# Patient Record
Sex: FEMALE | Race: BLACK OR AFRICAN AMERICAN | Employment: PART TIME | URBAN - METROPOLITAN AREA
[De-identification: names, ages, dates, MRNs, and addresses within clinical notes are randomized per-mention and may not be internally consistent; named-entity substitution may affect disease eponyms.]

---
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
## 2020-04-20 ENCOUNTER — HOSPITAL ENCOUNTER (EMERGENCY)
Age: 20
Discharge: HOME | End: 2020-04-20
Attending: EMERGENCY MEDICINE
Payer: COMMERCIAL

## 2020-04-20 VITALS
HEART RATE: 75 BPM | DIASTOLIC BLOOD PRESSURE: 59 MMHG | SYSTOLIC BLOOD PRESSURE: 103 MMHG | BODY MASS INDEX: 17.56 KG/M2 | OXYGEN SATURATION: 100 % | TEMPERATURE: 98.5 F | HEIGHT: 61 IN | RESPIRATION RATE: 16 BRPM | WEIGHT: 93 LBS

## 2020-04-20 LAB
CHP ED QC CHECK: YES
PREGNANCY TEST URINE, POC: NEGATIVE

## 2020-04-20 PROCEDURE — 81025 URINE PREGNANCY TEST: CPT

## 2020-04-20 PROCEDURE — 99283 EMERGENCY DEPT VISIT LOW MDM: CPT

## 2020-04-20 RX ORDER — PENICILLIN V POTASSIUM 500 MG/1
500 TABLET ORAL 4 TIMES DAILY
Qty: 40 TABLET | Refills: 0 | Status: SHIPPED | OUTPATIENT
Start: 2020-04-20

## 2020-04-20 RX ORDER — NAPROXEN 500 MG/1
500 TABLET ORAL 2 TIMES DAILY WITH MEALS
Qty: 20 TABLET | Refills: 0 | Status: SHIPPED | OUTPATIENT
Start: 2020-04-20

## 2020-04-20 ASSESSMENT — PAIN SCALES - GENERAL: PAINLEVEL_OUTOF10: 8

## 2020-04-20 NOTE — ED PROVIDER NOTES
PM      PATIENTREFERRED TO:   No follow-up provider specified.     DISCHARGE MEDICATIONS:     Discharge Medication List as of 4/20/2020  4:32 PM      START taking these medications    Details   naproxen (NAPROSYN) 500 MG tablet Take 1 tablet by mouth 2 times daily (with meals), Disp-20 tablet, R-0Print      penicillin v potassium (VEETID) 500 MG tablet Take 1 tablet by mouth 4 times daily, Disp-40 tablet, R-0Print                 (Please note that portions of this note were completed with a voice recognition program.  Efforts were made to edit thedictations but occasionally words are mis-transcribed.)    TUCKER Ernandez PA-C  04/20/20 1784

## 2020-04-20 NOTE — ED NOTES
Called Dr. Carmen Hassan office to verify where office note/pre-op clearance to be faxed to. Spoke with [employer]. Message left for Dr. Carmen Hassan nurse to call the office back. Dental pain for a few days. It is getting worse and she is having swelling on the right side. Her usual dentist is closed with covid and she called another that was open but they don't take her insurance. A+Ox4.  r     Keyanna Wolfe RN  04/20/20 9937

## 2020-04-22 LAB — HCG, PREGNANCY URINE (POC): NEGATIVE

## 2023-05-06 ENCOUNTER — HOSPITAL ENCOUNTER (INPATIENT)
Age: 23
LOS: 3 days | Discharge: HOME | DRG: 885 | End: 2023-05-09
Attending: PSYCHIATRY & NEUROLOGY | Admitting: PSYCHIATRY & NEUROLOGY
Payer: COMMERCIAL

## 2023-05-06 ENCOUNTER — APPOINTMENT (OUTPATIENT)
Dept: GENERAL RADIOLOGY | Age: 23
End: 2023-05-06
Payer: COMMERCIAL

## 2023-05-06 ENCOUNTER — HOSPITAL ENCOUNTER (EMERGENCY)
Age: 23
Discharge: ANOTHER HOSPITAL | End: 2023-05-06
Attending: EMERGENCY MEDICINE
Payer: COMMERCIAL

## 2023-05-06 VITALS
WEIGHT: 100 LBS | OXYGEN SATURATION: 91 % | DIASTOLIC BLOOD PRESSURE: 90 MMHG | TEMPERATURE: 98.1 F | HEART RATE: 71 BPM | SYSTOLIC BLOOD PRESSURE: 121 MMHG | BODY MASS INDEX: 18.88 KG/M2 | RESPIRATION RATE: 14 BRPM | HEIGHT: 61 IN

## 2023-05-06 DIAGNOSIS — K08.89 PAIN, DENTAL: ICD-10-CM

## 2023-05-06 DIAGNOSIS — R45.851 SUICIDAL IDEATION: Primary | ICD-10-CM

## 2023-05-06 DIAGNOSIS — F10.10 ETOH ABUSE: ICD-10-CM

## 2023-05-06 DIAGNOSIS — E87.6 HYPOKALEMIA: ICD-10-CM

## 2023-05-06 PROBLEM — F32.A DEPRESSION WITH SUICIDAL IDEATION: Status: ACTIVE | Noted: 2023-05-06

## 2023-05-06 LAB
ABSOLUTE EOS #: 0.06 K/UL (ref 0–0.44)
ABSOLUTE IMMATURE GRANULOCYTE: 0.01 K/UL (ref 0–0.3)
ABSOLUTE LYMPH #: 2.89 K/UL (ref 1.1–3.7)
ABSOLUTE MONO #: 1.01 K/UL (ref 0.1–1.2)
ACETAMINOPHEN LEVEL: <10 UG/ML (ref 10–30)
ALBUMIN SERPL-MCNC: 4.3 G/DL (ref 3.5–5.2)
ALP SERPL-CCNC: 79 U/L (ref 35–104)
ALT SERPL-CCNC: 16 U/L (ref 5–33)
AMPHETAMINE SCREEN URINE: NEGATIVE
ANION GAP SERPL CALCULATED.3IONS-SCNC: 14 MMOL/L (ref 9–17)
AST SERPL-CCNC: 25 U/L
BARBITURATE SCREEN URINE: NEGATIVE
BASOPHILS # BLD: 0 % (ref 0–2)
BASOPHILS ABSOLUTE: 0.03 K/UL (ref 0–0.2)
BENZODIAZEPINE SCREEN, URINE: NEGATIVE
BILIRUB DIRECT SERPL-MCNC: 0.1 MG/DL
BILIRUB INDIRECT SERPL-MCNC: 0.3 MG/DL (ref 0–1)
BILIRUB SERPL-MCNC: 0.4 MG/DL (ref 0.3–1.2)
BILIRUBIN URINE: NEGATIVE
BUN SERPL-MCNC: 7 MG/DL (ref 6–20)
BUN/CREAT BLD: 11 (ref 9–20)
CALCIUM SERPL-MCNC: 8.9 MG/DL (ref 8.6–10.4)
CANNABINOID SCREEN URINE: POSITIVE
CHLORIDE SERPL-SCNC: 106 MMOL/L (ref 98–107)
CO2 SERPL-SCNC: 24 MMOL/L (ref 20–31)
COCAINE METABOLITE, URINE: NEGATIVE
COLOR: YELLOW
CREAT SERPL-MCNC: 0.64 MG/DL (ref 0.5–0.9)
EKG ATRIAL RATE: 111 BPM
EKG P AXIS: 74 DEGREES
EKG P-R INTERVAL: 128 MS
EKG Q-T INTERVAL: 320 MS
EKG QRS DURATION: 92 MS
EKG QTC CALCULATION (BAZETT): 435 MS
EKG R AXIS: 76 DEGREES
EKG T AXIS: -63 DEGREES
EKG VENTRICULAR RATE: 111 BPM
EOSINOPHILS RELATIVE PERCENT: 1 % (ref 1–4)
EPITHELIAL CELLS UA: NORMAL /HPF (ref 0–5)
ETHANOL PERCENT: 0.24 %
ETHANOL: 241 MG/DL
FENTANYL URINE: NEGATIVE
GFR SERPL CREATININE-BSD FRML MDRD: >60 ML/MIN/1.73M2
GLUCOSE SERPL-MCNC: 104 MG/DL (ref 70–99)
GLUCOSE UR STRIP.AUTO-MCNC: NEGATIVE MG/DL
HCG QUALITATIVE: NEGATIVE
HCT VFR BLD AUTO: 32.3 % (ref 36.3–47.1)
HGB BLD-MCNC: 11.3 G/DL (ref 11.9–15.1)
IMMATURE GRANULOCYTES: 0 %
INR PPP: 1
KETONES UR STRIP.AUTO-MCNC: NEGATIVE MG/DL
LEUKOCYTE ESTERASE UR QL STRIP.AUTO: NEGATIVE
LIPASE SERPL-CCNC: 16 U/L (ref 13–60)
LYMPHOCYTES # BLD: 34 % (ref 24–43)
MAGNESIUM SERPL-MCNC: 2.1 MG/DL (ref 1.6–2.6)
MCH RBC QN AUTO: 31 PG (ref 25.2–33.5)
MCHC RBC AUTO-ENTMCNC: 35 G/DL (ref 28.4–34.8)
MCV RBC AUTO: 88.5 FL (ref 82.6–102.9)
METHADONE SCREEN, URINE: NEGATIVE
MONOCYTES # BLD: 12 % (ref 3–12)
NITRITE UR QL STRIP.AUTO: NEGATIVE
NRBC AUTOMATED: 0 PER 100 WBC
OPIATES, URINE: NEGATIVE
OXYCODONE SCREEN URINE: NEGATIVE
PARTIAL THROMBOPLASTIN TIME: 31 SEC (ref 23.9–33.8)
PDW BLD-RTO: 12.2 % (ref 11.8–14.4)
PHENCYCLIDINE, URINE: NEGATIVE
PHOSPHATE SERPL-MCNC: 2.9 MG/DL (ref 2.6–4.5)
PLATELET # BLD AUTO: 260 K/UL (ref 138–453)
PMV BLD AUTO: 9.2 FL (ref 8.1–13.5)
POTASSIUM SERPL-SCNC: 3.1 MMOL/L (ref 3.7–5.3)
PROT SERPL-MCNC: 7.4 G/DL (ref 6.4–8.3)
PROT UR STRIP.AUTO-MCNC: 6 MG/DL (ref 5–8)
PROT UR STRIP.AUTO-MCNC: NEGATIVE MG/DL
PROTHROMBIN TIME: 13.4 SEC (ref 11.5–14.2)
RBC # BLD: 3.65 M/UL (ref 3.95–5.11)
RBC CLUMPS #/AREA URNS AUTO: NORMAL /HPF (ref 0–2)
SALICYLATE LEVEL: <1 MG/DL (ref 3–10)
SEG NEUTROPHILS: 53 % (ref 36–65)
SEGMENTED NEUTROPHILS ABSOLUTE COUNT: 4.53 K/UL (ref 1.5–8.1)
SODIUM SERPL-SCNC: 144 MMOL/L (ref 135–144)
SPECIFIC GRAVITY UA: 1.01 (ref 1–1.03)
TEST INFORMATION: ABNORMAL
TROPONIN I SERPL DL<=0.01 NG/ML-MCNC: <6 NG/L (ref 0–14)
TURBIDITY: CLEAR
URINE HGB: ABNORMAL
UROBILINOGEN, URINE: NORMAL
WBC # BLD AUTO: 8.5 K/UL (ref 3.5–11.3)
WBC UA: NORMAL /HPF (ref 0–5)

## 2023-05-06 PROCEDURE — 84484 ASSAY OF TROPONIN QUANT: CPT

## 2023-05-06 PROCEDURE — 80307 DRUG TEST PRSMV CHEM ANLYZR: CPT

## 2023-05-06 PROCEDURE — 93005 ELECTROCARDIOGRAM TRACING: CPT | Performed by: EMERGENCY MEDICINE

## 2023-05-06 PROCEDURE — 85610 PROTHROMBIN TIME: CPT

## 2023-05-06 PROCEDURE — 6370000000 HC RX 637 (ALT 250 FOR IP): Performed by: PSYCHIATRY & NEUROLOGY

## 2023-05-06 PROCEDURE — 84703 CHORIONIC GONADOTROPIN ASSAY: CPT

## 2023-05-06 PROCEDURE — 1240000000 HC EMOTIONAL WELLNESS R&B

## 2023-05-06 PROCEDURE — 83735 ASSAY OF MAGNESIUM: CPT

## 2023-05-06 PROCEDURE — 84100 ASSAY OF PHOSPHORUS: CPT

## 2023-05-06 PROCEDURE — 80143 DRUG ASSAY ACETAMINOPHEN: CPT

## 2023-05-06 PROCEDURE — 71045 X-RAY EXAM CHEST 1 VIEW: CPT

## 2023-05-06 PROCEDURE — 6370000000 HC RX 637 (ALT 250 FOR IP): Performed by: EMERGENCY MEDICINE

## 2023-05-06 PROCEDURE — 85730 THROMBOPLASTIN TIME PARTIAL: CPT

## 2023-05-06 PROCEDURE — 85025 COMPLETE CBC W/AUTO DIFF WBC: CPT

## 2023-05-06 PROCEDURE — 80048 BASIC METABOLIC PNL TOTAL CA: CPT

## 2023-05-06 PROCEDURE — 99285 EMERGENCY DEPT VISIT HI MDM: CPT

## 2023-05-06 PROCEDURE — 80179 DRUG ASSAY SALICYLATE: CPT

## 2023-05-06 PROCEDURE — G0480 DRUG TEST DEF 1-7 CLASSES: HCPCS

## 2023-05-06 PROCEDURE — 96360 HYDRATION IV INFUSION INIT: CPT

## 2023-05-06 PROCEDURE — 80076 HEPATIC FUNCTION PANEL: CPT

## 2023-05-06 PROCEDURE — 83690 ASSAY OF LIPASE: CPT

## 2023-05-06 PROCEDURE — 2580000003 HC RX 258: Performed by: EMERGENCY MEDICINE

## 2023-05-06 PROCEDURE — 81001 URINALYSIS AUTO W/SCOPE: CPT

## 2023-05-06 PROCEDURE — 96361 HYDRATE IV INFUSION ADD-ON: CPT

## 2023-05-06 RX ORDER — AMOXICILLIN AND CLAVULANATE POTASSIUM 875; 125 MG/1; MG/1
1 TABLET, FILM COATED ORAL EVERY 12 HOURS SCHEDULED
Status: DISCONTINUED | OUTPATIENT
Start: 2023-05-06 | End: 2023-05-06 | Stop reason: HOSPADM

## 2023-05-06 RX ORDER — 0.9 % SODIUM CHLORIDE 0.9 %
1000 INTRAVENOUS SOLUTION INTRAVENOUS ONCE
Status: COMPLETED | OUTPATIENT
Start: 2023-05-06 | End: 2023-05-06

## 2023-05-06 RX ORDER — ACETAMINOPHEN 325 MG/1
650 TABLET ORAL EVERY 6 HOURS PRN
Status: DISCONTINUED | OUTPATIENT
Start: 2023-05-06 | End: 2023-05-09 | Stop reason: HOSPADM

## 2023-05-06 RX ORDER — HALOPERIDOL 5 MG/1
5 TABLET ORAL EVERY 6 HOURS PRN
Status: DISCONTINUED | OUTPATIENT
Start: 2023-05-06 | End: 2023-05-09 | Stop reason: HOSPADM

## 2023-05-06 RX ORDER — POTASSIUM CHLORIDE 20 MEQ/1
40 TABLET, EXTENDED RELEASE ORAL ONCE
Status: COMPLETED | OUTPATIENT
Start: 2023-05-06 | End: 2023-05-06

## 2023-05-06 RX ORDER — ACETAMINOPHEN 325 MG/1
650 TABLET ORAL ONCE
Status: COMPLETED | OUTPATIENT
Start: 2023-05-06 | End: 2023-05-06

## 2023-05-06 RX ORDER — 0.9 % SODIUM CHLORIDE 0.9 %
2000 INTRAVENOUS SOLUTION INTRAVENOUS ONCE
Status: COMPLETED | OUTPATIENT
Start: 2023-05-06 | End: 2023-05-06

## 2023-05-06 RX ORDER — MAGNESIUM HYDROXIDE/ALUMINUM HYDROXICE/SIMETHICONE 120; 1200; 1200 MG/30ML; MG/30ML; MG/30ML
30 SUSPENSION ORAL EVERY 6 HOURS PRN
Status: DISCONTINUED | OUTPATIENT
Start: 2023-05-06 | End: 2023-05-09 | Stop reason: HOSPADM

## 2023-05-06 RX ORDER — HYDROXYZINE 50 MG/1
50 TABLET, FILM COATED ORAL 3 TIMES DAILY PRN
Status: DISCONTINUED | OUTPATIENT
Start: 2023-05-06 | End: 2023-05-09 | Stop reason: HOSPADM

## 2023-05-06 RX ORDER — DIPHENHYDRAMINE HYDROCHLORIDE 50 MG/ML
50 INJECTION INTRAMUSCULAR; INTRAVENOUS EVERY 6 HOURS PRN
Status: DISCONTINUED | OUTPATIENT
Start: 2023-05-06 | End: 2023-05-09 | Stop reason: HOSPADM

## 2023-05-06 RX ORDER — CHLORDIAZEPOXIDE HYDROCHLORIDE 10 MG/1
10 CAPSULE, GELATIN COATED ORAL 3 TIMES DAILY
Status: DISCONTINUED | OUTPATIENT
Start: 2023-05-06 | End: 2023-05-08

## 2023-05-06 RX ORDER — TRAZODONE HYDROCHLORIDE 50 MG/1
50 TABLET ORAL NIGHTLY PRN
Status: DISCONTINUED | OUTPATIENT
Start: 2023-05-06 | End: 2023-05-08

## 2023-05-06 RX ORDER — HALOPERIDOL 5 MG/ML
5 INJECTION INTRAMUSCULAR EVERY 6 HOURS PRN
Status: DISCONTINUED | OUTPATIENT
Start: 2023-05-06 | End: 2023-05-09 | Stop reason: HOSPADM

## 2023-05-06 RX ORDER — AMOXICILLIN AND CLAVULANATE POTASSIUM 875; 125 MG/1; MG/1
1 TABLET, FILM COATED ORAL EVERY 12 HOURS SCHEDULED
Status: DISCONTINUED | OUTPATIENT
Start: 2023-05-06 | End: 2023-05-06

## 2023-05-06 RX ORDER — LORAZEPAM 1 MG/1
2 TABLET ORAL EVERY 6 HOURS PRN
Status: DISCONTINUED | OUTPATIENT
Start: 2023-05-06 | End: 2023-05-09 | Stop reason: HOSPADM

## 2023-05-06 RX ORDER — LORAZEPAM 2 MG/ML
2 INJECTION INTRAMUSCULAR EVERY 6 HOURS PRN
Status: DISCONTINUED | OUTPATIENT
Start: 2023-05-06 | End: 2023-05-09 | Stop reason: HOSPADM

## 2023-05-06 RX ORDER — SODIUM CHLORIDE 9 MG/ML
INJECTION, SOLUTION INTRAVENOUS CONTINUOUS
Status: DISCONTINUED | OUTPATIENT
Start: 2023-05-06 | End: 2023-05-06 | Stop reason: HOSPADM

## 2023-05-06 RX ORDER — IBUPROFEN 400 MG/1
400 TABLET ORAL EVERY 6 HOURS PRN
Status: DISCONTINUED | OUTPATIENT
Start: 2023-05-06 | End: 2023-05-09 | Stop reason: HOSPADM

## 2023-05-06 RX ORDER — POLYETHYLENE GLYCOL 3350 17 G/17G
17 POWDER, FOR SOLUTION ORAL DAILY PRN
Status: DISCONTINUED | OUTPATIENT
Start: 2023-05-06 | End: 2023-05-09 | Stop reason: HOSPADM

## 2023-05-06 RX ADMIN — AMOXICILLIN AND CLAVULANATE POTASSIUM 1 TABLET: 875; 125 TABLET, FILM COATED ORAL at 15:58

## 2023-05-06 RX ADMIN — CHLORDIAZEPOXIDE HYDROCHLORIDE 10 MG: 10 CAPSULE ORAL at 21:52

## 2023-05-06 RX ADMIN — SODIUM CHLORIDE 1000 ML: 9 INJECTION, SOLUTION INTRAVENOUS at 15:05

## 2023-05-06 RX ADMIN — SODIUM CHLORIDE 2000 ML: 9 INJECTION, SOLUTION INTRAVENOUS at 09:17

## 2023-05-06 RX ADMIN — SODIUM CHLORIDE 1000 ML: 9 INJECTION, SOLUTION INTRAVENOUS at 12:00

## 2023-05-06 RX ADMIN — ACETAMINOPHEN 650 MG: 325 TABLET ORAL at 15:59

## 2023-05-06 RX ADMIN — HYDROXYZINE HYDROCHLORIDE 50 MG: 50 TABLET, FILM COATED ORAL at 21:52

## 2023-05-06 RX ADMIN — TRAZODONE HYDROCHLORIDE 50 MG: 50 TABLET ORAL at 21:52

## 2023-05-06 RX ADMIN — POTASSIUM CHLORIDE 40 MEQ: 1500 TABLET, EXTENDED RELEASE ORAL at 12:05

## 2023-05-06 ASSESSMENT — PAIN SCALES - WONG BAKER: WONGBAKER_NUMERICALRESPONSE: 0

## 2023-05-06 NOTE — ED PROVIDER NOTES
EMERGENCY DEPARTMENT ENCOUNTER    Pt Name: Dannie Burt  MRN: [id number]  [place] 2000  Date of evaluation: 5/6/23  CHIEF COMPLAINT       Chief Complaint   Patient presents with    Alcohol Intoxication     HISTORY OF PRESENT ILLNESS   79-year-old female presenting to the ER after boyfriend called because of a possible seizure. The patient did drink alcohol last night and did utilize 4 pills of Mucinex as well as a pill of melatonin. The patient is clearly intoxicated. The history is provided by the patient and the EMS personnel. Altered Mental Status  Presenting symptoms: behavior changes    Severity:  Moderate        REVIEW OF SYSTEMS     Review of Systems   Unable to perform ROS: Other (intoxicated)   PASTMEDICAL HISTORY     Past Medical History:   Diagnosis Date    Depressed      Past Problem List  Patient Active Problem List   Diagnosis Code    Tricyclic overdose, intentional self-[facility] T43.012A    Severe recurrent major depression without psychotic features ([facility] F33.2    Depression with suicidal ideation F32. A, R45.851     SURGICAL HISTORY     History reviewed. No pertinent surgical history. CURRENT MEDICATIONS       Discharge Medication List as of 5/6/2023  5:28 PM        ALLERGIES     has No Known Allergies. FAMILY HISTORY     has no family status information on file. SOCIAL HISTORY       Social History     Tobacco Use    Smoking status: Former    Smokeless tobacco: Never   Vaping Use    Vaping Use: Some days   Substance Use Topics    Alcohol use: Yes    Drug use: Yes     Types: Marijuana (Weed)     PHYSICAL EXAM     INITIAL VITALS: BP (!) 121/90   Pulse 71   Temp 98.1 °F (36.7 °C) (Oral)   Resp 14   Ht 5' 1\" (1.549 m)   Wt 100 lb (45.4 kg)   LMP 04/22/2023 (Approximate)   SpO2 91%   BMI 18.89 kg/m²    Physical Exam  Vitals reviewed. Constitutional:       General: She is not in acute distress. Appearance: Normal appearance.  She is not ill-appearing

## 2023-05-07 LAB
EKG ATRIAL RATE: 75 BPM
EKG P AXIS: 38 DEGREES
EKG P-R INTERVAL: 108 MS
EKG Q-T INTERVAL: 400 MS
EKG QRS DURATION: 78 MS
EKG QTC CALCULATION (BAZETT): 446 MS
EKG R AXIS: 54 DEGREES
EKG T AXIS: 61 DEGREES
EKG VENTRICULAR RATE: 75 BPM
HCG(URINE) PREGNANCY TEST: NEGATIVE

## 2023-05-07 PROCEDURE — 6370000000 HC RX 637 (ALT 250 FOR IP): Performed by: PSYCHIATRY & NEUROLOGY

## 2023-05-07 PROCEDURE — 1240000000 HC EMOTIONAL WELLNESS R&B

## 2023-05-07 PROCEDURE — 99222 1ST HOSP IP/OBS MODERATE 55: CPT | Performed by: INTERNAL MEDICINE

## 2023-05-07 PROCEDURE — 81025 URINE PREGNANCY TEST: CPT

## 2023-05-07 RX ORDER — MIRTAZAPINE 15 MG/1
7.5 TABLET, FILM COATED ORAL NIGHTLY
Status: DISCONTINUED | OUTPATIENT
Start: 2023-05-07 | End: 2023-05-09 | Stop reason: HOSPADM

## 2023-05-07 RX ORDER — POTASSIUM CHLORIDE 20 MEQ/1
20 TABLET, EXTENDED RELEASE ORAL ONCE
Status: COMPLETED | OUTPATIENT
Start: 2023-05-07 | End: 2023-05-07

## 2023-05-07 RX ORDER — POTASSIUM CHLORIDE 20 MEQ/1
40 TABLET, EXTENDED RELEASE ORAL ONCE
Status: DISCONTINUED | OUTPATIENT
Start: 2023-05-07 | End: 2023-05-09 | Stop reason: HOSPADM

## 2023-05-07 RX ADMIN — HYDROXYZINE HYDROCHLORIDE 50 MG: 50 TABLET, FILM COATED ORAL at 21:23

## 2023-05-07 RX ADMIN — CHLORDIAZEPOXIDE HYDROCHLORIDE 10 MG: 10 CAPSULE ORAL at 21:23

## 2023-05-07 RX ADMIN — TRAZODONE HYDROCHLORIDE 50 MG: 50 TABLET ORAL at 22:19

## 2023-05-07 RX ADMIN — POTASSIUM CHLORIDE 20 MEQ: 1500 TABLET, EXTENDED RELEASE ORAL at 16:18

## 2023-05-07 RX ADMIN — CHLORDIAZEPOXIDE HYDROCHLORIDE 10 MG: 10 CAPSULE ORAL at 08:34

## 2023-05-07 RX ADMIN — CHLORDIAZEPOXIDE HYDROCHLORIDE 10 MG: 10 CAPSULE ORAL at 13:51

## 2023-05-07 RX ADMIN — MIRTAZAPINE 7.5 MG: 15 TABLET, FILM COATED ORAL at 21:23

## 2023-05-07 ASSESSMENT — PAIN SCALES - GENERAL: PAINLEVEL_OUTOF10: 0

## 2023-05-07 ASSESSMENT — LIFESTYLE VARIABLES
HOW OFTEN DO YOU HAVE A DRINK CONTAINING ALCOHOL: 2-3 TIMES A WEEK
HOW MANY STANDARD DRINKS CONTAINING ALCOHOL DO YOU HAVE ON A TYPICAL DAY: 3 OR 4

## 2023-05-07 ASSESSMENT — SLEEP AND FATIGUE QUESTIONNAIRES
AVERAGE NUMBER OF SLEEP HOURS: 8
SLEEP PATTERN: DIFFICULTY FALLING ASLEEP;DISTURBED/INTERRUPTED SLEEP
DO YOU HAVE DIFFICULTY SLEEPING: YES
DO YOU USE A SLEEP AID: YES

## 2023-05-08 LAB
ANION GAP SERPL CALCULATED.3IONS-SCNC: 9 MMOL/L (ref 9–17)
BUN SERPL-MCNC: 12 MG/DL (ref 6–20)
CALCIUM SERPL-MCNC: 8.8 MG/DL (ref 8.6–10.4)
CHLORIDE SERPL-SCNC: 106 MMOL/L (ref 98–107)
CO2 SERPL-SCNC: 25 MMOL/L (ref 20–31)
CREAT SERPL-MCNC: 0.65 MG/DL (ref 0.5–0.9)
EKG ATRIAL RATE: 75 BPM
EKG P AXIS: 38 DEGREES
EKG P-R INTERVAL: 108 MS
EKG Q-T INTERVAL: 400 MS
EKG QRS DURATION: 78 MS
EKG QTC CALCULATION (BAZETT): 446 MS
EKG R AXIS: 54 DEGREES
EKG T AXIS: 61 DEGREES
EKG VENTRICULAR RATE: 75 BPM
GFR SERPL CREATININE-BSD FRML MDRD: >60 ML/MIN/1.73M2
GLUCOSE SERPL-MCNC: 94 MG/DL (ref 70–99)
POTASSIUM SERPL-SCNC: 3.4 MMOL/L (ref 3.7–5.3)
SODIUM SERPL-SCNC: 140 MMOL/L (ref 135–144)

## 2023-05-08 PROCEDURE — 36415 COLL VENOUS BLD VENIPUNCTURE: CPT

## 2023-05-08 PROCEDURE — 1240000000 HC EMOTIONAL WELLNESS R&B

## 2023-05-08 PROCEDURE — 80048 BASIC METABOLIC PNL TOTAL CA: CPT

## 2023-05-08 PROCEDURE — 6370000000 HC RX 637 (ALT 250 FOR IP): Performed by: PSYCHIATRY & NEUROLOGY

## 2023-05-08 PROCEDURE — 93010 ELECTROCARDIOGRAM REPORT: CPT | Performed by: INTERNAL MEDICINE

## 2023-05-08 RX ORDER — TRAZODONE HYDROCHLORIDE 100 MG/1
100 TABLET ORAL NIGHTLY PRN
Qty: 30 TABLET | Refills: 0 | Status: SHIPPED | OUTPATIENT
Start: 2023-05-08

## 2023-05-08 RX ORDER — TRAZODONE HYDROCHLORIDE 100 MG/1
100 TABLET ORAL NIGHTLY PRN
Status: DISCONTINUED | OUTPATIENT
Start: 2023-05-08 | End: 2023-05-09 | Stop reason: HOSPADM

## 2023-05-08 RX ORDER — MIRTAZAPINE 7.5 MG/1
7.5 TABLET, FILM COATED ORAL NIGHTLY
Qty: 30 TABLET | Refills: 0 | Status: SHIPPED | OUTPATIENT
Start: 2023-05-08

## 2023-05-08 RX ADMIN — IBUPROFEN 400 MG: 400 TABLET, FILM COATED ORAL at 15:04

## 2023-05-08 RX ADMIN — TRAZODONE HYDROCHLORIDE 100 MG: 100 TABLET ORAL at 22:20

## 2023-05-08 RX ADMIN — HYDROXYZINE HYDROCHLORIDE 50 MG: 50 TABLET, FILM COATED ORAL at 22:20

## 2023-05-08 RX ADMIN — CHLORDIAZEPOXIDE HYDROCHLORIDE 10 MG: 10 CAPSULE ORAL at 14:26

## 2023-05-08 RX ADMIN — MIRTAZAPINE 7.5 MG: 15 TABLET, FILM COATED ORAL at 22:20

## 2023-05-08 RX ADMIN — CHLORDIAZEPOXIDE HYDROCHLORIDE 10 MG: 10 CAPSULE ORAL at 08:36

## 2023-05-08 ASSESSMENT — PAIN SCALES - GENERAL
PAINLEVEL_OUTOF10: 0
PAINLEVEL_OUTOF10: 5

## 2023-05-08 ASSESSMENT — PAIN DESCRIPTION - LOCATION: LOCATION: HEAD

## 2023-05-08 ASSESSMENT — PAIN - FUNCTIONAL ASSESSMENT: PAIN_FUNCTIONAL_ASSESSMENT: ACTIVITIES ARE NOT PREVENTED

## 2023-05-08 NOTE — PLAN OF CARE
Problem: Pain  Goal: Verbalizes/displays adequate comfort level or baseline comfort level  5/8/2023 0957 by Teresita Jara RN  Outcome: Progressing     Problem: Depression  Goal: Will be euthymic at discharge  Description: INTERVENTIONS:  1. Administer medication as ordered  2. Provide emotional support via 1:1 interaction with staff  3. Encourage involvement in milieu/groups/activities  4. Monitor for social isolation  5/8/2023 0957 by Teresita Jara RN  Outcome: Progressing     Problem: Anxiety  Goal: Will report anxiety at manageable levels  Description: INTERVENTIONS:  1. Administer medication as ordered  2. Teach and rehearse alternative coping skills  3. Provide emotional support with 1:1 interaction with staff  5/8/2023 0957 by Teresita Jara RN  Outcome: Progressing   Patient has remained in control, eating and sleeping well, denies any thoughts of self harm, denies anxiety and depression, denies any pain. Pain medications available as needed and patient will seek out staff for them. Patient is very discharged focused states she is fine and just needs to go home. Encouraged group participation discuss feelings, will continue to support and monitor patient.

## 2023-05-08 NOTE — GROUP NOTE
Psych-Ed/Relapse Prevention Group Note        Date: May 8, 2023 Start Time:  1345   End Time:  4864      Number of Participants in [address]:  5/17    Topic: Leisure     Goal of Group:Patient will identify benefits of leisure for coping      Comments:     Patient did not participate in Psych-Ed/Relapse Prevention group, despite staff encouragement and explanation of benefits. Patient remain seclusive to self. Q15 minute safety checks maintained for patient safety and will continue to encourage patient to attend unit programming.          Signature:  Heather Montelongo, [address]

## 2023-05-08 NOTE — GROUP NOTE
Group Therapy Note    Date: 5/8/2023    Group Start Time: 0900  Group End Time: 0930  Group Topic: Group Therapy    DEBRA Ortiz LPN        Group Therapy Note    Attendees: 11/20       Patient's Goal:  discharge planning    Status After Intervention:  Decompensated    Participation Level: Minimal    Participation Quality: Appropriate      Speech:  pressured      Thought Process/Content: Linear      Affective Functioning: Congruent      Mood: depressed      Level of consciousness:  Alert and Oriented x4      Response to Learning: Able to verbalize current knowledge/experience      Endings: None Reported    Modes of Intervention: Education      Discipline Responsible: Licensed Practical Nurse      Signature:  Geraldo Ortiz LPN

## 2023-05-08 NOTE — GROUP NOTE
Group Therapy Note    Date: 5/8/2023    Group Start Time: 1100  Group End Time: 1530  Group Topic: Psychoeducation    [facility]    Group Therapy Note    Attendees: 12/20       Patient's Goal:  Patient will identify benefits of gratitude and journaling for coping and stress management. Notes:  Patient attended group and participated    Status After Intervention:  Improved    Participation Level:  Active Listener and Interactive    Participation Quality: Appropriate, Attentive, and Supportive      Speech:  normal      Thought Process/Content: Logical  Linear      Affective Functioning: Constricted      Mood: euthymic      Level of consciousness:  Alert, Oriented x4, and Attentive      Response to Learning: Able to verbalize current knowledge/experience, Able to verbalize/acknowledge new learning, Able to retain information, Capable of insight, Able to change behavior, and Progressing to goal      Endings: None Reported    Modes of Intervention: Education, Support, Socialization, and Exploration      Discipline Responsible: Psychoeducational Specialist      Signature:  Vila Cogan, [place]

## 2023-05-08 NOTE — PLAN OF CARE
Patient was out in the milieu aloof of peers. Patient admits to feeling depressed and anxious. Patient states that she has not been sleeping well. Patient denies any suicidal thoughts at this time. Patient agrees to come talk with staff if having any thoughts to harm herself this shift. 15 min rounds continued for patient safety. Problem: Pain  Goal: Verbalizes/displays adequate comfort level or baseline comfort level  5/8/2023 0326 by Maciel Carmichael RN  Outcome: Progressing  5/7/2023 1916 by Beth Tanner RN  Outcome: Progressing     Problem: Depression  Goal: Will be euthymic at discharge  Description: INTERVENTIONS:  1. Administer medication as ordered  2. Provide emotional support via 1:1 interaction with staff  3. Encourage involvement in milieu/groups/activities  4. Monitor for social isolation  5/8/2023 0326 by Maciel Carmichael RN  Outcome: Progressing  5/7/2023 1916 by Beth Tanner RN  Outcome: Progressing     Problem: Anxiety  Goal: Will report anxiety at manageable levels  Description: INTERVENTIONS:  1. Administer medication as ordered  2. Teach and rehearse alternative coping skills  3.  Provide emotional support with 1:1 interaction with staff  5/8/2023 0326 by Maciel Carmichael RN  Outcome: Progressing  5/7/2023 1916 by Beth Tanner RN  Outcome: Progressing

## 2023-05-09 VITALS
SYSTOLIC BLOOD PRESSURE: 110 MMHG | BODY MASS INDEX: 18.4 KG/M2 | WEIGHT: 100 LBS | RESPIRATION RATE: 14 BRPM | DIASTOLIC BLOOD PRESSURE: 71 MMHG | HEART RATE: 67 BPM | HEIGHT: 62 IN | TEMPERATURE: 97.3 F

## 2023-05-09 NOTE — PLAN OF CARE
Problem: Depression  Goal: Will be euthymic at discharge  Description: INTERVENTIONS:  1. Administer medication as ordered  2. Provide emotional support via 1:1 interaction with staff  3. Encourage involvement in milieu/groups/activities  4. Monitor for social isolation  5/9/2023 1004 by Sulaiman Frye  Outcome: Completed     Problem: Anxiety  Goal: Will report anxiety at manageable levels  Description: INTERVENTIONS:  1. Administer medication as ordered  2. Teach and rehearse alternative coping skills  3. Provide emotional support with 1:1 interaction with staff  5/9/2023 1004 by Sulaiman Frye  Outcome: Completed   Pt currently denies any thoughts to harm self or others denies any hallucinations reports normal sleep and appetite attending groups cooperative with treatment. discharge orders in for today.

## 2023-05-09 NOTE — DISCHARGE INSTRUCTIONS
Information:  Medications:   Medication summary provided   I understand that I should take only the medications on my list.     -why and when I need to take each medicine.     -which side effects to watch for.     -that I should carry my medication information at all times in case of     Emergency situations. I will take all of my medicines to follow up appointments.     -check with my physician or pharmacist before taking any new    Medication, over the counter product or drink alcohol.    -Ask about food, drug or dietary supplement interactions.    -discard old lists and update records with medication providers. Notify Physician:  Notify physician if you notice:   Always call 911 if you feel your life is in danger  In case of an emergency call 911 immediately! If 911 is not available call your local emergency medical system for help    Behavioral Health Follow Up:  Original Referral Source:[facility]  Discharge Diagnosis: Depression with suicidal ideation [F32. A, R45.851]  Recommendations for Level of Care: outpatient   Patient status at discharge: stable  My hospital  was: [facility]  Aftercare plan faxed: [facility] Hemp   -faxed by: nurse   -date: 5.9.23   -time: 1400  Prescriptions: [facility]     Smoking: Quit Smoking. Call the [employer]'s smoking quitline at 0-319-62C-QUIT  Know the signs of a heart attack   If you have any of the following symptoms call 911 immediately, do not wait more    Than five minutes. 1. Pressure, fullness and/ or squeezing in the center of the chest spreading to    The jaw, neck or shoulder. 2. Chest discomfort with light headedness, fainting, sweating, nausea or    Shortness of breath. 3. Upper abdominal pressure or discomfort. 4. Lower chest pain, back pain, unusual fatigue, shortness of breath, nausea   Or dizziness.      General Information:   Questions regarding your bill: Call HELP program [phone]     Suicide Hotline ([address])  (304)

## 2023-05-09 NOTE — GROUP NOTE
Group Therapy Note    Date: 5/9/2023    Group Start Time: 0900  Group End Time: 0920  Group Topic: Community Meeting    DEBRA Cruz        Group Therapy Note    Attendees: 4       Patient's Goal:  to be discharged today     Status After Intervention:  Improved    Participation Level:  Active Listener and Interactive    Participation Quality: Appropriate and Attentive      Speech:  normal      Thought Process/Content: Logical      Affective Functioning: Congruent      Mood: euthymic      Level of consciousness:  Alert and Oriented x4      Response to Learning: Able to verbalize current knowledge/experience and Progressing to goal      Endings: None Reported    Modes of Intervention: Education and Socialization      Discipline Responsible: Behavorial Health Tech      Signature:  Matilda Olivier

## 2023-05-09 NOTE — PLAN OF CARE
Problem: Pain  Goal: Verbalizes/displays adequate comfort level or baseline comfort level  5/8/2023 2112 by Tez Garg LPN  Outcome: Progressing   Patient currently denies any pain. Patient encouraged to seek staff if pain presents. Safety checks maintained. Will continue to monitor. Problem: Depression  Goal: Will be euthymic at discharge  Description: INTERVENTIONS:  1. Administer medication as ordered  2. Provide emotional support via 1:1 interaction with staff  3. Encourage involvement in milieu/groups/activities  4. Monitor for social isolation  5/8/2023 2112 by Tez Garg LPN  Outcome: Progressing   Patient out and social with select peers. Patient is agreeable to taking night medications ordered. Patient is calm and maintains behavioral control on unit. Patient reports readiness for discharge. Safety checks maintained. Will continue to monitor. Problem: Anxiety  Goal: Will report anxiety at manageable levels  Description: INTERVENTIONS:  1. Administer medication as ordered  2. Teach and rehearse alternative coping skills  3. Provide emotional support with 1:1 interaction with staff  5/8/2023 2112 by Tez Garg LPN  Outcome: Progressing   Patient out and social with select peers. Patient is agreeable to taking night medications ordered. Patient is calm and maintains behavioral control on unit. Patient reports readiness for discharge. Safety checks maintained. Will continue to monitor.

## 2023-05-09 NOTE — BH NOTE
[facility]  Discharge Note    Pt discharged with followings belongings:   Dental Appliances: None  Vision - Corrective Lenses: None  Hearing Aid: None  Jewelry: Other (Comment)  Body Piercings Removed: No  Clothing: Other (Comment)  Other Valuables: Other (Comment)   No belongings to return to patient. Patient educated on aftercare instructions:   Information faxed to [facility] by nurse at 3:10 PM .Patient verbalize understanding of AVS:  Discharged with 2 medications that have been explained to her and she verbalized understanding the use of said medications. Family member picked patient up in private vehicle to dc to home. Status EXAM upon discharge:  Mental Status and Behavioral Exam  Normal: No  Level of Assistance: Independent/Self  Facial Expression: Brightened  Affect: Appropriate  Level of Consciousness: Alert  Frequency of Checks: 4 times per hour, close  Mood:Normal: No  Mood: Anxious  Motor Activity:Normal: Yes  Eye Contact: Good  Observed Behavior: Cooperative  Sexual Misconduct History: Current - no  Preception: [place] to person, [place] to time, [place] to place, [place] to situation  Attention:Normal: No  Attention: Distractible  Thought Processes: Circumstantial  Thought Content:Normal: Yes  Thought Content: Preoccupations  Depression Symptoms: No problems reported or observed. Anxiety Symptoms: No problems reported or observed. Lizzy Symptoms: No problems reported or observed.   Hallucinations: None  Delusions: No  Memory:Normal: Yes  Insight and Judgment: No  Insight and Judgment: Poor judgment    Tobacco Screening:  Practical Counseling, on admission, soni X, if applicable and completed (first 3 are required if patient doesn't refuse)declined:            ( ) Recognizing danger situations (included triggers and roadblocks)                    ( ) Coping skills (new ways to manage stress,relaxation techniques, changing routine, distraction)

## 2023-05-09 NOTE — CARE COORDINATION
At request of pt social work provided pt with work note:     2023        To whom it may concern;      Please be advised that Neptali Gomez  2000 was admitted to the hospital on 2023 and a discharged on 2023. Aj Pond can return to work on 23. If you have any questions, please refer to patient due to HIPAA law we cannot release any other requested information. Sincerely,  [place] [id number] [facility], [address] [place] [address] DISPLAY PLAN FREE TEXT

## 2023-05-09 NOTE — GROUP NOTE
Group Therapy Note    Date: 5/8/2023    Group Start Time: 2000  Group End Time: 2025  Group Topic: Group Therapy    [facility]    Senthil Hall RN        Group Therapy Note    Attendees: 6    patient refused to attend ABCs of Coping skills group at 2000 hrs  after encouragement from staff.   1:1 talk time was offered but declined as alternative to group session            Signature:  Senthil Hall RN

## 2023-05-09 NOTE — BH NOTE
Patient given tobacco quitline number 70541099315 at this time, refusing to call at this time, states \" I just dont want to quit now\"- patient given information as to the dangers of long term tobacco use. Continue to reinforce the importance of tobacco cessation.

## 2023-05-09 NOTE — CARE COORDINATION
Name: Santi Pérez    : 2000    Discharge Date: 23    Primary Auth/Cert #:     Destination: home     Discharge Medications:      Medication List        START taking these medications      mirtazapine 7.5 MG tablet  Commonly known as: REMERON  Take 1 tablet by mouth nightly  Notes to patient: Anti depressant     traZODone 100 MG tablet  Commonly known as: DESYREL  Take 1 tablet by mouth nightly as needed for Sleep  Notes to patient: sleep               Where to Get Your Medications        [facility] [id number] [facility] [address]      Phone: [phone]   mirtazapine 7.5 MG tablet  traZODone 100 MG tablet         Follow Up Appointment: [address] [place] [address]  [phone]    Follow up on 5/15/2023  Pt has appointment at 8:30 am with [facility].

## 2023-05-10 NOTE — DISCHARGE SUMMARY
appointments. Follow-up as scheduled with [facility]       Signed:    Electronically signed by CHAYO Anthony CNP on 5/9/23 at 8:32 PM EDT    Time Spent on discharge is more than 30 minutes in the examination, evaluation, counseling and review of medications and discharge plan.

## 2023-11-12 ENCOUNTER — APPOINTMENT (OUTPATIENT)
Dept: GENERAL RADIOLOGY | Age: 23
End: 2023-11-12

## 2023-11-12 ENCOUNTER — HOSPITAL ENCOUNTER (EMERGENCY)
Age: 23
Discharge: HOME | End: 2023-11-12
Attending: EMERGENCY MEDICINE

## 2023-11-12 VITALS
SYSTOLIC BLOOD PRESSURE: 99 MMHG | RESPIRATION RATE: 18 BRPM | BODY MASS INDEX: 19.69 KG/M2 | WEIGHT: 107 LBS | HEIGHT: 62 IN | TEMPERATURE: 97.8 F | DIASTOLIC BLOOD PRESSURE: 57 MMHG | HEART RATE: 80 BPM

## 2023-11-12 DIAGNOSIS — H57.89 IRRITATION OF LEFT EYE: Primary | ICD-10-CM

## 2023-11-12 PROCEDURE — 99283 EMERGENCY DEPT VISIT LOW MDM: CPT

## 2023-11-12 RX ORDER — KETOROLAC TROMETHAMINE 5 MG/ML
1 SOLUTION OPHTHALMIC 3 TIMES DAILY
Qty: 5 ML | Refills: 0 | Status: SHIPPED | OUTPATIENT
Start: 2023-11-12 | End: 2023-11-15

## 2023-11-12 RX ORDER — LORAZEPAM 0.5 MG/1
0.5 TABLET ORAL ONCE
Status: DISCONTINUED | OUTPATIENT
Start: 2023-11-12 | End: 2023-11-12

## 2023-11-12 ASSESSMENT — VISUAL ACUITY: OU: 1

## 2023-11-12 ASSESSMENT — PAIN DESCRIPTION - LOCATION: LOCATION: EYE

## 2023-11-12 ASSESSMENT — PAIN DESCRIPTION - ORIENTATION: ORIENTATION: LEFT

## 2023-11-12 ASSESSMENT — PAIN - FUNCTIONAL ASSESSMENT: PAIN_FUNCTIONAL_ASSESSMENT: 0-10

## 2023-11-12 ASSESSMENT — PAIN SCALES - GENERAL: PAINLEVEL_OUTOF10: 8

## 2023-11-12 NOTE — DISCHARGE INSTRUCTIONS
Return to this emergency room immediately if your symptoms persist, worsen or if new ones form. Make sure you follow-up with your primary care doctor within the next 1-2 business days.
1

## 2023-11-12 NOTE — ED TRIAGE NOTES
Patient comes in with what could be a foreign object in her upper left eye, states that she woke up this morning and felt something in her eye and is unable to see out of it. Left eye is teary and sensitive to light.

## 2024-07-17 ENCOUNTER — ANESTHESIA EVENT (OUTPATIENT)
Dept: LABOR AND DELIVERY | Age: 24
End: 2024-07-17
Payer: COMMERCIAL

## 2024-07-17 ENCOUNTER — ANESTHESIA (OUTPATIENT)
Dept: LABOR AND DELIVERY | Age: 24
End: 2024-07-17
Payer: COMMERCIAL

## 2024-07-17 ENCOUNTER — HOSPITAL ENCOUNTER (INPATIENT)
Age: 24
LOS: 3 days | Discharge: HOME | End: 2024-07-20
Attending: STUDENT IN AN ORGANIZED HEALTH CARE EDUCATION/TRAINING PROGRAM | Admitting: STUDENT IN AN ORGANIZED HEALTH CARE EDUCATION/TRAINING PROGRAM
Payer: COMMERCIAL

## 2024-07-17 PROBLEM — O42.90 SPROM (PROLONGED SPONTANEOUS RUPTURE OF MEMBRANES): Status: ACTIVE | Noted: 2024-07-17

## 2024-07-17 LAB
ABO + RH BLD: NORMAL
ARM BAND NUMBER: NORMAL
BASOPHILS # BLD: <0.03 K/UL (ref 0–0.2)
BASOPHILS NFR BLD: 0 % (ref 0–2)
BLOOD BANK SAMPLE EXPIRATION: NORMAL
BLOOD GROUP ANTIBODIES SERPL: NEGATIVE
EOSINOPHIL # BLD: 0.09 K/UL (ref 0–0.44)
EOSINOPHILS RELATIVE PERCENT: 1 % (ref 1–4)
ERYTHROCYTE [DISTWIDTH] IN BLOOD BY AUTOMATED COUNT: 13.1 % (ref 11.8–14.4)
HCT VFR BLD AUTO: 29.5 % (ref 36.3–47.1)
HGB BLD-MCNC: 10.6 G/DL (ref 11.9–15.1)
IMM GRANULOCYTES # BLD AUTO: 0.09 K/UL (ref 0–0.3)
IMM GRANULOCYTES NFR BLD: 1 %
LYMPHOCYTES NFR BLD: 1.51 K/UL (ref 1.1–3.7)
LYMPHOCYTES RELATIVE PERCENT: 13 % (ref 24–43)
MCH RBC QN AUTO: 34 PG (ref 25.2–33.5)
MCHC RBC AUTO-ENTMCNC: 35.9 G/DL (ref 28.4–34.8)
MCV RBC AUTO: 94.6 FL (ref 82.6–102.9)
MONOCYTES NFR BLD: 0.93 K/UL (ref 0.1–1.2)
MONOCYTES NFR BLD: 8 % (ref 3–12)
NEUTROPHILS NFR BLD: 77 % (ref 36–65)
NEUTS SEG NFR BLD: 8.64 K/UL (ref 1.5–8.1)
NRBC BLD-RTO: 0 PER 100 WBC
PLATELET # BLD AUTO: 147 K/UL (ref 138–453)
PMV BLD AUTO: 11.4 FL (ref 8.1–13.5)
RBC # BLD AUTO: 3.12 M/UL (ref 3.95–5.11)
T PALLIDUM AB SER QL IA: NONREACTIVE
WBC OTHER # BLD: 11.3 K/UL (ref 3.5–11.3)

## 2024-07-17 PROCEDURE — 2580000003 HC RX 258

## 2024-07-17 PROCEDURE — 86780 TREPONEMA PALLIDUM: CPT

## 2024-07-17 PROCEDURE — 85025 COMPLETE CBC W/AUTO DIFF WBC: CPT

## 2024-07-17 PROCEDURE — 86900 BLOOD TYPING SEROLOGIC ABO: CPT

## 2024-07-17 PROCEDURE — 6370000000 HC RX 637 (ALT 250 FOR IP)

## 2024-07-17 PROCEDURE — 86850 RBC ANTIBODY SCREEN: CPT

## 2024-07-17 PROCEDURE — 80307 DRUG TEST PRSMV CHEM ANLYZR: CPT

## 2024-07-17 PROCEDURE — 86901 BLOOD TYPING SEROLOGIC RH(D): CPT

## 2024-07-17 PROCEDURE — 6360000002 HC RX W HCPCS: Performed by: REGISTERED NURSE

## 2024-07-17 PROCEDURE — 6360000002 HC RX W HCPCS

## 2024-07-17 PROCEDURE — 87081 CULTURE SCREEN ONLY: CPT

## 2024-07-17 PROCEDURE — 87086 URINE CULTURE/COLONY COUNT: CPT

## 2024-07-17 PROCEDURE — 2500000003 HC RX 250 WO HCPCS: Performed by: REGISTERED NURSE

## 2024-07-17 PROCEDURE — 1220000000 HC SEMI PRIVATE OB R&B

## 2024-07-17 RX ORDER — SODIUM CHLORIDE, SODIUM LACTATE, POTASSIUM CHLORIDE, AND CALCIUM CHLORIDE .6; .31; .03; .02 G/100ML; G/100ML; G/100ML; G/100ML
500 INJECTION, SOLUTION INTRAVENOUS PRN
Status: DISCONTINUED | OUTPATIENT
Start: 2024-07-17 | End: 2024-07-18

## 2024-07-17 RX ORDER — SODIUM CHLORIDE 0.9 % (FLUSH) 0.9 %
5-40 SYRINGE (ML) INJECTION PRN
Status: DISCONTINUED | OUTPATIENT
Start: 2024-07-17 | End: 2024-07-18

## 2024-07-17 RX ORDER — LIDOCAINE HYDROCHLORIDE 10 MG/ML
INJECTION, SOLUTION EPIDURAL; INFILTRATION; INTRACAUDAL; PERINEURAL PRN
Status: DISCONTINUED | OUTPATIENT
Start: 2024-07-17 | End: 2024-07-18 | Stop reason: SDUPTHER

## 2024-07-17 RX ORDER — SODIUM CHLORIDE, SODIUM LACTATE, POTASSIUM CHLORIDE, CALCIUM CHLORIDE 600; 310; 30; 20 MG/100ML; MG/100ML; MG/100ML; MG/100ML
INJECTION, SOLUTION INTRAVENOUS CONTINUOUS
Status: DISCONTINUED | OUTPATIENT
Start: 2024-07-17 | End: 2024-07-18

## 2024-07-17 RX ORDER — CALCIUM CARBONATE 500 MG/1
750 TABLET, CHEWABLE ORAL 3 TIMES DAILY PRN
Status: DISCONTINUED | OUTPATIENT
Start: 2024-07-17 | End: 2024-07-20 | Stop reason: HOSPADM

## 2024-07-17 RX ORDER — LIDOCAINE HYDROCHLORIDE AND EPINEPHRINE 15; 5 MG/ML; UG/ML
INJECTION, SOLUTION EPIDURAL PRN
Status: DISCONTINUED | OUTPATIENT
Start: 2024-07-17 | End: 2024-07-18 | Stop reason: SDUPTHER

## 2024-07-17 RX ORDER — ACETAMINOPHEN 500 MG
1000 TABLET ORAL EVERY 6 HOURS PRN
Status: DISCONTINUED | OUTPATIENT
Start: 2024-07-17 | End: 2024-07-18

## 2024-07-17 RX ORDER — NALBUPHINE HYDROCHLORIDE 10 MG/ML
5 INJECTION, SOLUTION INTRAMUSCULAR; INTRAVENOUS; SUBCUTANEOUS ONCE
Status: DISCONTINUED | OUTPATIENT
Start: 2024-07-17 | End: 2024-07-18

## 2024-07-17 RX ORDER — SODIUM CHLORIDE 0.9 % (FLUSH) 0.9 %
5-40 SYRINGE (ML) INJECTION EVERY 12 HOURS SCHEDULED
Status: DISCONTINUED | OUTPATIENT
Start: 2024-07-17 | End: 2024-07-18

## 2024-07-17 RX ORDER — SODIUM CHLORIDE 9 MG/ML
INJECTION, SOLUTION INTRAVENOUS PRN
Status: DISCONTINUED | OUTPATIENT
Start: 2024-07-17 | End: 2024-07-18

## 2024-07-17 RX ORDER — SEVOFLURANE 250 ML/250ML
1 LIQUID RESPIRATORY (INHALATION) CONTINUOUS PRN
Status: DISCONTINUED | OUTPATIENT
Start: 2024-07-17 | End: 2024-07-18

## 2024-07-17 RX ORDER — ROPIVACAINE HYDROCHLORIDE 2 MG/ML
INJECTION, SOLUTION EPIDURAL; INFILTRATION; PERINEURAL
Status: COMPLETED
Start: 2024-07-17 | End: 2024-07-17

## 2024-07-17 RX ORDER — FAMOTIDINE 20 MG/1
20 TABLET, FILM COATED ORAL 2 TIMES DAILY PRN
Status: DISCONTINUED | OUTPATIENT
Start: 2024-07-17 | End: 2024-07-18

## 2024-07-17 RX ORDER — ROPIVACAINE HYDROCHLORIDE 2 MG/ML
INJECTION, SOLUTION EPIDURAL; INFILTRATION; PERINEURAL PRN
Status: DISCONTINUED | OUTPATIENT
Start: 2024-07-17 | End: 2024-07-18 | Stop reason: SDUPTHER

## 2024-07-17 RX ORDER — SODIUM CHLORIDE, SODIUM LACTATE, POTASSIUM CHLORIDE, AND CALCIUM CHLORIDE .6; .31; .03; .02 G/100ML; G/100ML; G/100ML; G/100ML
1000 INJECTION, SOLUTION INTRAVENOUS PRN
Status: DISCONTINUED | OUTPATIENT
Start: 2024-07-17 | End: 2024-07-18

## 2024-07-17 RX ORDER — ONDANSETRON 2 MG/ML
4 INJECTION INTRAMUSCULAR; INTRAVENOUS EVERY 6 HOURS PRN
Status: DISCONTINUED | OUTPATIENT
Start: 2024-07-17 | End: 2024-07-18

## 2024-07-17 RX ORDER — EPHEDRINE SULFATE 50 MG/ML
5 INJECTION INTRAVENOUS EVERY 5 MIN PRN
Status: DISCONTINUED | OUTPATIENT
Start: 2024-07-17 | End: 2024-07-18

## 2024-07-17 RX ORDER — NALOXONE HYDROCHLORIDE 0.4 MG/ML
INJECTION, SOLUTION INTRAMUSCULAR; INTRAVENOUS; SUBCUTANEOUS PRN
Status: DISCONTINUED | OUTPATIENT
Start: 2024-07-17 | End: 2024-07-18

## 2024-07-17 RX ADMIN — Medication 2.5 MILLION UNITS: at 21:44

## 2024-07-17 RX ADMIN — LIDOCAINE HYDROCHLORIDE,EPINEPHRINE BITARTRATE 2 ML: 15; .005 INJECTION, SOLUTION EPIDURAL; INFILTRATION; INTRACAUDAL; PERINEURAL at 22:52

## 2024-07-17 RX ADMIN — SODIUM CHLORIDE, POTASSIUM CHLORIDE, SODIUM LACTATE AND CALCIUM CHLORIDE: 600; 310; 30; 20 INJECTION, SOLUTION INTRAVENOUS at 17:28

## 2024-07-17 RX ADMIN — Medication 8 ML/HR: at 22:55

## 2024-07-17 RX ADMIN — Medication 25 MCG: at 17:39

## 2024-07-17 RX ADMIN — SODIUM CHLORIDE, POTASSIUM CHLORIDE, SODIUM LACTATE AND CALCIUM CHLORIDE 1000 ML: 600; 310; 30; 20 INJECTION, SOLUTION INTRAVENOUS at 22:49

## 2024-07-17 RX ADMIN — FAMOTIDINE 20 MG: 20 TABLET, FILM COATED ORAL at 20:47

## 2024-07-17 RX ADMIN — Medication 5 ML: at 22:54

## 2024-07-17 RX ADMIN — SODIUM CHLORIDE 5 MILLION UNITS: 900 INJECTION INTRAVENOUS at 17:39

## 2024-07-17 RX ADMIN — LIDOCAINE HYDROCHLORIDE,EPINEPHRINE BITARTRATE 3 ML: 15; .005 INJECTION, SOLUTION EPIDURAL; INFILTRATION; INTRACAUDAL; PERINEURAL at 22:48

## 2024-07-17 RX ADMIN — LIDOCAINE HYDROCHLORIDE 3 ML: 10 INJECTION, SOLUTION EPIDURAL; INFILTRATION; INTRACAUDAL; PERINEURAL at 22:44

## 2024-07-17 RX ADMIN — ANTACID TABLETS 750 MG: 500 TABLET, CHEWABLE ORAL at 20:47

## 2024-07-17 NOTE — DISCHARGE SUMMARY
Obstetric Discharge Summary  [facility]    Patient Name: Max Verdin  Patient : 2000  Primary Care Physician: No primary care provider on file.  Admit Date: 2024    Principal Diagnosis: IUP at Unknown, admitted for PROM     Her pregnancy has been complicated by:   Patient Active Problem List   Diagnosis    Tricyclic overdose, intentional self-harm, initial encounter ([facility])    Severe recurrent major depression without psychotic features ([facility])    Depression with suicidal ideation    SPROM (prolonged spontaneous rupture of membranes)     23 M APG 3/8 Wt 5#4     Infection Present?: No  Hospital Acquired: No    Surgical Operations & Procedures:  Analgesia: epidural  Delivery Type: Spontaneous Vaginal Delivery: See Labor and Delivery Summary   Laceration(s): bilateral periurethral, unrepaired    Consultations: NICU and Anesthesia     Pertinent Findings & Procedures:   Max Verdin is a 23 y.o. female  at 35w3d admitted for PROM; received cytotec 25 mcg PO x1, nitrous, nubain, epidural, pitocin, IUPC, amnioinfusion.     She delivered by spontaneous vaginal a Live Born infant on 24.       Information for the patient's :  Bobby Verdin [[id number]]   male   Birth Weight: 2.38 kg (5 lb 4 oz)     Apgars: 3 at 1 minute and 8 at 5 minutes.     Postpartum course: normal.      Course of patient: uncomplicated    Discharge to: Home    Readmission planned: no     Indication for 6 week PP 2 hour GTT?: no     Eligible for 2 week PP virtual visit? no - private patient     Recommendations on Discharge:     Medications:      Medication List        START taking these medications      acetaminophen 500 MG tablet  Commonly known as: TYLENOL  Take 2 tablets by mouth every 6 hours as needed for Pain     ibuprofen 600 MG tablet  Commonly known as: ADVIL;MOTRIN  Take 1 tablet by mouth every 6 hours as needed for Pain     senna 8.6 MG tablet  Commonly known as:

## 2024-07-17 NOTE — FLOWSHEET NOTE
Dr Wallace bedside to speak to patient and patient mother. Patient states she SROM yesterday at 1930 and went  to [facility] and \"sat in a room for a long time and said there was no SROM and  the Dr threw the test away and then took the test out of the garbage\". Patient went to Dr office and was told she was \"grossly ruptured and  needed to come to hospital and [facility] said they were full\" so patient came here. Patient states good fetal movement and rates occasional contractions 7 out of 10.

## 2024-07-17 NOTE — H&P
Index/Volume: >2x2 cm pocket   Estimated Fetal Weight:  7 lbs 14 oz    PRENATAL LAB RESULTS:  Blood Type/Rh: O pos  Antibody Screen: negative  Hemoglobin, Hematocrit, Platelets: 8.9/26.6/183  Rubella: immune  T. Pallidum, IgG: non-reactive  Hepatitis B Surface Antigen: non-reactive   Hepatitis C Antibody: non-reactive   HIV: non-reactive   Gonorrhea: negative  Chlamydia: negative  Urine culture: negative, date: 24    No GTT on chart reveiw    Group B Strep: unknown   Cystic Fibrosis Screen: not available  Sickle Cell Screen: positive  First Trimester Screen: not available  QUAD screen: not available  msAFP: not available   Non-Invasive Prenatal Testing: not available  Anatomy US: right lateral placenta, 3VC, male gender, fetal heart defect, otherwise normal anatomy    ASSESSMENT & PLAN:  Max Verdin is a 23 y.o. female  at 35w3d   - GBS collected / Rh positive / R immune   - Pen G for GBS prophylaxis   - VSS     IOL 2/2 PROM   - Admit to labor and delivery under the service of Dr. Dennis    - VSS    - cEFM and TOCO   - Cat 1 FHT and TOCO showing irritability    - CBC, T&S, T.Pal ordered   - UDS ordered. R/B/A discussed with patient and patient agreeable   - IVF: 125 ml/hr    - BSUS: cephalic, EFW 4#13   - SVE: /-2   - Plan of Induction: cytotec PO    - Continue expectant management     Anemia    - Noted on initial prenatal labs    - Denies s/s of anemia on admission    - CBC ordered     SS Trait    - Pt follows with [facility]     Depression, Hx Suicidal Ideation    - Denies s/s, denies SI/HI    - Previously on Remeron and Trazodone nightly, not currently on medication     Late prenatal care    - Dating ultrasound @ 29 weeks    - Pt follows with [facility] throughout pregnancy    - SW consult ordered     Fetal Heart Defect    - Noted on [facility] US    - Fetal Echo revealed PFO/PDA, recommended  echocardiogram     BMI 48      Patient Active Problem List    Diagnosis Date Noted    SPROM

## 2024-07-17 NOTE — FLOWSHEET NOTE
Pt educated on the use of self-administered  nitrous oxide for pain control and side effects. Pt educated on when and how to use the mask appropriately. Pt educated that she is the only person allowed to hold the mask to her face and that no one should prop the mask against her face. Pt also educated that she is the only person in the room allowed to use the mask.Pt informed that she cannot be out of bed without a trained support person with her. Pt completed a return demonstration of the use of nitrous oxide and all questions and concerns were addressed. RN verified the presence of a signed agreement form for the nitrous oxide. Pre-intervention VS, assessment, and FHT'st as charted. Nitrous oxide and oxygen at a 50-50 mix was initiated at 1858. RN remains at bedside for the first 15 mins post initiation. Pt has experienced no as side effects at this time.

## 2024-07-18 LAB
AMPHET UR QL SCN: NEGATIVE
BARBITURATES UR QL SCN: NEGATIVE
BENZODIAZ UR QL: NEGATIVE
CANNABINOIDS UR QL SCN: NEGATIVE
COCAINE UR QL SCN: NEGATIVE
FENTANYL UR QL: NEGATIVE
METHADONE UR QL: NEGATIVE
MICROORGANISM SPEC CULT: NORMAL
OPIATES UR QL SCN: NEGATIVE
OXYCODONE UR QL SCN: NEGATIVE
PCP UR QL SCN: NEGATIVE
SERVICE CMNT-IMP: NORMAL
SPECIMEN DESCRIPTION: NORMAL
TEST INFORMATION: NORMAL

## 2024-07-18 PROCEDURE — 99465 NB RESUSCITATION: CPT

## 2024-07-18 PROCEDURE — 2500000003 HC RX 250 WO HCPCS: Performed by: ANESTHESIOLOGY

## 2024-07-18 PROCEDURE — 6370000000 HC RX 637 (ALT 250 FOR IP)

## 2024-07-18 PROCEDURE — 2580000003 HC RX 258

## 2024-07-18 PROCEDURE — 10H07YZ INSERTION OF OTHER DEVICE INTO PRODUCTS OF CONCEPTION, VIA NATURAL OR ARTIFICIAL OPENING: ICD-10-PCS

## 2024-07-18 PROCEDURE — 3E0DXGC INTRODUCTION OF OTHER THERAPEUTIC SUBSTANCE INTO MOUTH AND PHARYNX, EXTERNAL APPROACH: ICD-10-PCS

## 2024-07-18 PROCEDURE — 59409 OBSTETRICAL CARE: CPT | Performed by: STUDENT IN AN ORGANIZED HEALTH CARE EDUCATION/TRAINING PROGRAM

## 2024-07-18 PROCEDURE — 6360000002 HC RX W HCPCS: Performed by: ANESTHESIOLOGY

## 2024-07-18 PROCEDURE — 6360000002 HC RX W HCPCS

## 2024-07-18 PROCEDURE — 88307 TISSUE EXAM BY PATHOLOGIST: CPT

## 2024-07-18 PROCEDURE — 1220000000 HC SEMI PRIVATE OB R&B

## 2024-07-18 PROCEDURE — 7200000001 HC VAGINAL DELIVERY

## 2024-07-18 RX ORDER — ONDANSETRON 2 MG/ML
4 INJECTION INTRAMUSCULAR; INTRAVENOUS EVERY 6 HOURS PRN
Status: DISCONTINUED | OUTPATIENT
Start: 2024-07-18 | End: 2024-07-18

## 2024-07-18 RX ORDER — SENNOSIDES A AND B 8.6 MG/1
1 TABLET, FILM COATED ORAL 2 TIMES DAILY
Qty: 60 TABLET | Refills: 11 | Status: SHIPPED | OUTPATIENT
Start: 2024-07-18 | End: 2025-07-18

## 2024-07-18 RX ORDER — FERROUS SULFATE 325(65) MG
325 TABLET, DELAYED RELEASE (ENTERIC COATED) ORAL
Status: DISCONTINUED | OUTPATIENT
Start: 2024-07-18 | End: 2024-07-20 | Stop reason: HOSPADM

## 2024-07-18 RX ORDER — SODIUM CHLORIDE 9 MG/ML
INJECTION, SOLUTION INTRAVENOUS CONTINUOUS
Status: DISCONTINUED | OUTPATIENT
Start: 2024-07-18 | End: 2024-07-18

## 2024-07-18 RX ORDER — ACETAMINOPHEN 500 MG
1000 TABLET ORAL EVERY 6 HOURS PRN
Qty: 480 TABLET | Refills: 0 | Status: SHIPPED | OUTPATIENT
Start: 2024-07-18 | End: 2024-09-16

## 2024-07-18 RX ORDER — LANOLIN 72 %
OINTMENT (GRAM) TOPICAL PRN
Status: DISCONTINUED | OUTPATIENT
Start: 2024-07-18 | End: 2024-07-20 | Stop reason: HOSPADM

## 2024-07-18 RX ORDER — SENNA AND DOCUSATE SODIUM 50; 8.6 MG/1; MG/1
2 TABLET, FILM COATED ORAL NIGHTLY
Status: DISCONTINUED | OUTPATIENT
Start: 2024-07-18 | End: 2024-07-20 | Stop reason: HOSPADM

## 2024-07-18 RX ORDER — 0.9 % SODIUM CHLORIDE 0.9 %
300 INTRAVENOUS SOLUTION INTRAVENOUS ONCE
Status: COMPLETED | OUTPATIENT
Start: 2024-07-18 | End: 2024-07-18

## 2024-07-18 RX ORDER — SODIUM CHLORIDE 0.9 % (FLUSH) 0.9 %
5-40 SYRINGE (ML) INJECTION EVERY 12 HOURS SCHEDULED
Status: DISCONTINUED | OUTPATIENT
Start: 2024-07-18 | End: 2024-07-18

## 2024-07-18 RX ORDER — ONDANSETRON 4 MG/1
4 TABLET, ORALLY DISINTEGRATING ORAL EVERY 6 HOURS PRN
Status: DISCONTINUED | OUTPATIENT
Start: 2024-07-18 | End: 2024-07-18

## 2024-07-18 RX ORDER — SODIUM CHLORIDE 9 MG/ML
INJECTION, SOLUTION INTRAVENOUS PRN
Status: DISCONTINUED | OUTPATIENT
Start: 2024-07-18 | End: 2024-07-18

## 2024-07-18 RX ORDER — ONDANSETRON 2 MG/ML
4 INJECTION INTRAMUSCULAR; INTRAVENOUS EVERY 6 HOURS PRN
Status: DISCONTINUED | OUTPATIENT
Start: 2024-07-18 | End: 2024-07-18 | Stop reason: SDUPTHER

## 2024-07-18 RX ORDER — IBUPROFEN 600 MG/1
600 TABLET ORAL EVERY 6 HOURS PRN
Qty: 120 TABLET | Refills: 1 | Status: SHIPPED | OUTPATIENT
Start: 2024-07-18 | End: 2024-09-16

## 2024-07-18 RX ORDER — ONDANSETRON 4 MG/1
4 TABLET, ORALLY DISINTEGRATING ORAL EVERY 6 HOURS PRN
Status: DISCONTINUED | OUTPATIENT
Start: 2024-07-18 | End: 2024-07-18 | Stop reason: SDUPTHER

## 2024-07-18 RX ORDER — IBUPROFEN 600 MG/1
600 TABLET ORAL EVERY 6 HOURS SCHEDULED
Status: DISCONTINUED | OUTPATIENT
Start: 2024-07-18 | End: 2024-07-20 | Stop reason: HOSPADM

## 2024-07-18 RX ORDER — ACETAMINOPHEN 500 MG
1000 TABLET ORAL EVERY 6 HOURS SCHEDULED
Status: DISCONTINUED | OUTPATIENT
Start: 2024-07-18 | End: 2024-07-20 | Stop reason: HOSPADM

## 2024-07-18 RX ORDER — SIMETHICONE 80 MG
80 TABLET,CHEWABLE ORAL EVERY 6 HOURS PRN
Status: DISCONTINUED | OUTPATIENT
Start: 2024-07-18 | End: 2024-07-20 | Stop reason: HOSPADM

## 2024-07-18 RX ORDER — BISACODYL 10 MG
10 SUPPOSITORY, RECTAL RECTAL DAILY PRN
Status: DISCONTINUED | OUTPATIENT
Start: 2024-07-18 | End: 2024-07-20 | Stop reason: HOSPADM

## 2024-07-18 RX ORDER — SODIUM CHLORIDE 0.9 % (FLUSH) 0.9 %
5-40 SYRINGE (ML) INJECTION PRN
Status: DISCONTINUED | OUTPATIENT
Start: 2024-07-18 | End: 2024-07-20 | Stop reason: HOSPADM

## 2024-07-18 RX ADMIN — EPHEDRINE SULFATE 5 MG: 50 INJECTION INTRAVENOUS at 03:00

## 2024-07-18 RX ADMIN — ACETAMINOPHEN 1000 MG: 500 TABLET ORAL at 06:45

## 2024-07-18 RX ADMIN — Medication 2.5 MILLION UNITS: at 01:37

## 2024-07-18 RX ADMIN — Medication 1 MILLI-UNITS/MIN: at 00:35

## 2024-07-18 RX ADMIN — ONDANSETRON 4 MG: 2 INJECTION INTRAMUSCULAR; INTRAVENOUS at 03:00

## 2024-07-18 RX ADMIN — IBUPROFEN 600 MG: 600 TABLET, FILM COATED ORAL at 22:31

## 2024-07-18 RX ADMIN — SODIUM CHLORIDE 300 ML: 9 INJECTION, SOLUTION INTRAVENOUS at 04:02

## 2024-07-18 RX ADMIN — SENNOSIDES AND DOCUSATE SODIUM 2 TABLET: 50; 8.6 TABLET ORAL at 20:44

## 2024-07-18 RX ADMIN — IBUPROFEN 600 MG: 600 TABLET, FILM COATED ORAL at 08:33

## 2024-07-18 RX ADMIN — SODIUM CHLORIDE, POTASSIUM CHLORIDE, SODIUM LACTATE AND CALCIUM CHLORIDE 500 ML: 600; 310; 30; 20 INJECTION, SOLUTION INTRAVENOUS at 03:08

## 2024-07-18 RX ADMIN — IBUPROFEN 600 MG: 600 TABLET, FILM COATED ORAL at 17:15

## 2024-07-18 RX ADMIN — Medication 166.7 ML: at 05:15

## 2024-07-18 RX ADMIN — ACETAMINOPHEN 1000 MG: 500 TABLET ORAL at 22:31

## 2024-07-18 RX ADMIN — SODIUM CHLORIDE, PRESERVATIVE FREE 10 ML: 5 INJECTION INTRAVENOUS at 20:44

## 2024-07-18 RX ADMIN — ACETAMINOPHEN 1000 MG: 500 TABLET ORAL at 17:15

## 2024-07-18 ASSESSMENT — PAIN - FUNCTIONAL ASSESSMENT: PAIN_FUNCTIONAL_ASSESSMENT: ACTIVITIES ARE NOT PREVENTED

## 2024-07-18 ASSESSMENT — PAIN DESCRIPTION - ORIENTATION: ORIENTATION: MID

## 2024-07-18 ASSESSMENT — PAIN DESCRIPTION - FREQUENCY: FREQUENCY: INTERMITTENT

## 2024-07-18 ASSESSMENT — PAIN DESCRIPTION - LOCATION: LOCATION: ABDOMEN

## 2024-07-18 ASSESSMENT — PAIN DESCRIPTION - PAIN TYPE: TYPE: ACUTE PAIN

## 2024-07-18 ASSESSMENT — PAIN DESCRIPTION - DESCRIPTORS: DESCRIPTORS: DISCOMFORT;CRAMPING

## 2024-07-18 ASSESSMENT — PAIN SCALES - GENERAL
PAINLEVEL_OUTOF10: 5
PAINLEVEL_OUTOF10: 6

## 2024-07-18 NOTE — CONSULTS
Pt desires to pump for baby in the NICU. Reviewed how to get a breast pump through WIC and frequency of pumping. Set up breast pump for initiation and explained settings to use. Pt to get up to bathroom and will initiate pumping when back in bed. Encouraged to call out for assistance as needed.    Initiation of Electric Breast Pumping     Pumping Initiated at 1045 (instructions and pt plans to initiate when back in bed)    Initiated due to    [x]   Baby in NICU   []   Plans exclusive pumping   []   Infant weight loss(supplement)   []   Baby not latching well    Flange Size    Right:   Left:     [x]   21    [x]   21     []   27    []   27     []   30    []   30     []   36    []   36  Instructions   [x]   Verbal instructions on how to setup pump and how to use initiation phase   [x]   Written sheet\" How to keep your breast pump kit clean\"   [x]   Expectation sheet for Breastfeeding mothers with pumping log   [x]   Frequency of pumping   [x]   Collection,labeling and storage of colostrum and milk    Supplies Provided   [x]   Pump initiation kit   [x]   Cleaning supplies (basin and soap)   [x]   Additional flange size   []   Oral syringes/snappies   []   Patient labels

## 2024-07-18 NOTE — PROGRESS NOTES
Giana at bedside at 2238  Epidural procedure explained, risks discussed.  Pt verbalizes consent for epidural.   2240 patient positioned for epidural.2242 Time out completed.   2246 catheter placed. 2248 test dose given, HR 76.  Epidural catheter taped and secured per anesthesia.   2250 to low fowlers with left uterine displacement. 2254 loading dose given. 2254 pump initiated.  Pt tolerated procedure well.

## 2024-07-18 NOTE — PROGRESS NOTES
Social Work    Met with patient at bedside to complete consult for late PNC as well as NICU admission.  Patient very pleasant and talkative.   This is her 1st baby Jorden Posadas. She reported that she delivered early and baby is in NICU.  FOB is Camron Posadas and they reside together.   Has all needed baby items and has a crib and bassinet for safe sleep. Is also linked with WIC and food stamps. She stated that she does need a breast pump and plans to contact WIC.   Has good family support from her family and FOB's family. She stated this is the 1st grand baby for her mom so they are very excited.   Addressed late PNC and patient stated she did not find out she was pregnant until she was 23 wks. She stated her stomach was flat and she had no idea. Did receive some care at [facility] and then went to [facility] but didn't feel comfortable there so decided to come to [facility].   Denied any current signs of anxiety or depression but stated she does have a history of it. Is not currently on any meds or receiving any counseling. Discussed if any signs or symptoms arise to reach out to OB.   Discussed [facility] and [facility] and patient would like a referral.  will make referral.   Denied any issues with transportation to get to the NICU.   Has not decided on a PCP yet.   Informed patient to reach out to  for any needs.

## 2024-07-18 NOTE — ANESTHESIA PRE PROCEDURE
\"PO2ART\", \"OHN4GZJ\", \"GJK3PNQ\", \"BEART\", \"V9VCCUOP\"     Type & Screen (If Applicable):  No results found for: \"LABABO\"    Drug/Infectious Status (If Applicable):  No results found for: \"HIV\", \"HEPCAB\"    COVID-19 Screening (If Applicable): No results found for: \"COVID19\"        Anesthesia Evaluation    Airway: Mallampati: II  TM distance: >3 FB   Neck ROM: full  Mouth opening: > = 3 FB   Dental: normal exam         Pulmonary:normal exam                               Cardiovascular:                      Neuro/Psych:   (+) psychiatric history:            GI/Hepatic/Renal:             Endo/Other:                     Abdominal: normal exam            Vascular:          Other Findings:             Anesthesia Plan      epidural     ASA 2             Anesthetic plan and risks discussed with patient.    Use of blood products discussed with patient whom consented to blood products.    Plan discussed with CRNA and attending.                    CHAYO Castillo - NIELS   7/17/2024

## 2024-07-18 NOTE — CARE COORDINATION
CASE MANAGEMENT POST-PARTUM TRANSITIONAL CARE PLAN    SPROM (prolonged spontaneous rupture of membranes) [O42.90]    OB Provider: [facility]    Writer met w/ Max at her bedside to discuss DCP. She is S/P  on 2024    Writer verified address/phone number correct on facesheet. She states she lives with her boyfriend Camron Posadas. Max denied barriers with transportation home, to doctors appointments or with paying for medications upon discharge home.     [facility] Choice insurance correct for mom through her mom.  She also has secondary Medicaid ([employer]) which she is going to add the baby to. Writer notified Max she has 30 days from date of birth to add  to insurance policy by contacting [employer].  She verbalized understanding.    Infant name on BC: Jorden Posadas.   Infant PCP: Undecided. List Given.     DME: no  HOME CARE: no    Anticipated DC of mother 1-2 days after . Baby in NICU    Readmission Risk              Risk of Unplanned Readmission:  4

## 2024-07-18 NOTE — L&D DELIVERY NOTE
End of Mother's Information  Mother: Max Verdin #[id number]                Delivery Providers    Delivering clinician: Sade Dang DO     Provider Role    Melvin Dennis DO Obstetrician    Marlene Sheikh Primary Nurse    Bella Oseguera RN Primary [place] Nurse    Sofie Hines, APRN - CNP Nurse Practitioner               Assessment    Living Status: Living        Skin Color:   Heart Rate:   Reflex Irritability:   Muscle Tone:   Respiratory Effort:   Total:            1 Minute:    0    1    1    1    0    3         5 Minute:    1    2    2    1    2    8                                        Apgars Assigned By: MINH HINES NNP              Resuscitation    Method: Bulb Suction, Stimulation, CPAP, Suctioning, PPV > 1 minute             [place] Measurements      Birth Weight: 2380 g   Birth Length: 44.5 cm     Head Circumference: 33 cm     Abdominal Girth: 27.5 cm              Skin to Skin      Reason Skin to Skin Not Initiated:  Acuity                    Vaginal Delivery Note  Department of Obstetrics and Gynecology  [facility]       Patient: Max Verdin   : 2000  MRN: [id number]   Date of delivery: 24     Pre-operative Diagnosis: Max Verdin  at 35w4d  Spontaneous vaginal delivery  Induction of labor secondary to  premature rupture of membrane  BMI 48     Post-operative Diagnosis:  Max Verdin  at 35w4d  Spontaneous vaginal delivery  Induction of labor secondary to  premature rupture of membrane  Left occiput anterior presentation  BMI 48    Delivering Obstetrician & Assistant(s): Melvin Dennis MD, Attending, Rebekah Jenkins MD, PGY4, Sade Dang DO, PGY1     Infant Information:   Information for the patient's :  Bobby Verdin [[id number]]      Information for the patient's :  Bobby Verdin [[id number]]        Apgar scores: 3 at 1 minute and 8 at 5 minutes.     Anesthesia:  epidural  with the above. I have personally seen the patient. I agree with the assessment, plan and orders as documented. I have made changes to the above note as needed. I have discussed the case with above named attending.     Patient is in agreement with plan. All questions answered.    Rebekah Jenkins MD  OB/GYN Resident  7/18/2024  8:57 AM

## 2024-07-18 NOTE — ANESTHESIA PROCEDURE NOTES
Epidural Block    Patient location during procedure: OR  Start time: 7/17/2024 10:40 PM  End time: 7/17/2024 10:46 PM  Reason for block: labor epidural  Staffing  Performed: resident/CRNA   Resident/CRNA: Gabrielle Caro APRN - CRNA  Performed by: Gabrielle Caro APRN - CRNA  Authorized by: Miguel Butler MD    Epidural  Patient position: sitting  Prep: Betadine  Patient monitoring: continuous pulse ox and frequent blood pressure checks  Approach: midline  Location: L3-4  Injection technique: ERIC saline and ERIC air  Provider prep: mask and sterile gloves  Needle  Needle type: Tuohy   Needle gauge: 17 G  Needle length: 3.5 in  Needle insertion depth: 4 cm  Catheter type: side hole  Catheter size: 19 G  Catheter at skin depth: 11 cm  Test dose: negativeCatheter Secured: tegaderm and tape  Assessment  Hemodynamics: stable  Attempts: 1  Outcomes: uncomplicated and patient tolerated procedure well  Preanesthetic Checklist  Completed: patient identified, IV checked, risks and benefits discussed, surgical/procedural consents, equipment checked, pre-op evaluation, timeout performed, anesthesia consent given, oxygen available, monitors applied/VS acknowledged, fire risk safety assessment completed and verbalized and blood product R/B/A discussed and consented

## 2024-07-18 NOTE — CARE COORDINATION
ANTEPARTUM NOTE    SPROM (prolonged spontaneous rupture of membranes) [O42.90]    Ermelinda was admitted to L&D on 7/17/2024 for LOF/labor @ 35 3/7    OB GYN Provider: [facility].     Will meet with patient after delivery to verify name/address/phone/insurance and discuss discharge planning.     Anticipate DC home 2 nights after vaginal delivery or 4 nights after C/S delivery as long as hemodynamically stable.

## 2024-07-18 NOTE — PROGRESS NOTES
Labor Progress Note    Max Verdin is a 23 y.o. female  at 35w4d  The patient was seen and examined. Her pain is well controlled. She reports fetal movement is present, complains of contractions, complains of loss of fluid, denies vaginal bleeding.       Vital Signs:  Vitals:    24 0001 24 0006 24 0011 24 0016   BP: (!) 79/48      Pulse: 73      Resp:       Temp:       TempSrc:       SpO2: 99% 98% 94% 96%         FHT:  135 , moderate variability, accelerations present, decelerations absent  Contractions: irregular    Chaperone for Intimate Exam: Chaperone was present for entire exam, Chaperone Name: RN  Cervical Exam: 3 cm dilated, 80 effaced, 0 station  Pitocin: @ 0 mu/min    Membranes: Ruptured clear fluid  Scalp Electrode in place: absent  Intrauterine Pressure Catheter in Place: absent    Interventions: SVE    Assessment/Plan:  Max Verdin is a 23 y.o. female  at 35w4d admitted for IOL 2/2 PROM   - GBS unknown, Pen G for GBS prophylaxis   - BP hypotensive but improved without intervention, afebrile   - cEFM/TOCO   - SVE 3/80/0  - Pitocin ordered per protocol   - Will continue to monitor closely        Attending updated and in agreement with plan    Sade Dang DO  Ob/Gyn Resident  2024, 12:26 AM

## 2024-07-18 NOTE — ANESTHESIA POSTPROCEDURE EVALUATION
Department of Anesthesiology  Postprocedure Note    Patient: Max Verdin  MRN: [id number]  YOB: 2000  Date of evaluation: 7/18/2024    Procedure Summary       Date: 07/17/24 Room / Location:     Anesthesia Start: 2238 Anesthesia Stop: 07/18/24 0511    Procedure: Labor Analgesia Diagnosis:     Scheduled Providers:  Responsible Provider: Miguel Butler MD    Anesthesia Type: epidural ASA Status: 2            Anesthesia Type: No value filed.    Jorge Phase I:      Jorge Phase II:      Anesthesia Post Evaluation    Patient location during evaluation: PACU  Patient participation: complete - patient participated  Level of consciousness: awake and alert  Airway patency: patent  Nausea & Vomiting: no nausea and no vomiting  Cardiovascular status: blood pressure returned to baseline  Respiratory status: acceptable  Hydration status: euvolemic  Pain management: adequate    No notable events documented.

## 2024-07-18 NOTE — PROGRESS NOTES
Vaginal Delivery Note  Department of Obstetrics and Gynecology  [facility]       Patient: Max Verdin   : 2000  MRN: [id number]   Date of delivery: 24     Pre-operative Diagnosis:   Max Verdin  at 35w4d  Spontaneous vaginal delivery  Induction of labor secondary to  premature rupture of membrane  BMI 48    Post-operative Diagnosis:    Max Verdin  at 35w4d  Spontaneous vaginal delivery  Induction of labor secondary to  premature rupture of membrane  Left occiput anterior presentation  BMI 48    Delivering Obstetrician & Assistant(s): Melvin Dennis MD, Attending, Rebekah Jenkins MD, PGY4, Sade Dang DO, PGY1    Infant Information:   Information for the patient's :  Bobby Verdin [[id number]]      Information for the patient's :  Bobby Verdin [[id number]]        Apgar scores: 3 at 1 minute and 8 at 5 minutes.     Anesthesia:  epidural anesthesia    Application and Delivery:    She was known to be GBS Unknown and received penicillin for antibiotic prophylaxis.     The patient progressed well, receive an epidural, became complete and felt the urge to push. After pushing with contractions the fetal head delivered Cephalic, left occiput anterior over an intact perineum, nuchal cord was present and was doubly clamped and cut.  The anterior, then posterior shoulder delivered easily and atraumatically followed by the rest of the infant. Baby was attended by NICU staff and RN. Nose and mouth suctioned with bulb suction, infant was stimulated and dried. The delivery of the placenta was spontaneous and appeared intact, whole, that the umbilical cord had three vessels noted, and a marginal cord insertion was visualized. Pitocin was started. The vagina was swept of all clots and debris. The perineum and vagina were evaluated and minor bilateral periurethral lacerations were found and did not need to be repaired.      Dr. Dennis

## 2024-07-18 NOTE — PROGRESS NOTES
Labor Progress Note    Max Verdin is a 23 y.o. female  at 35w3d  The patient was seen and examined. Her pain is well controlled. She reports fetal movement is present, complains of contractions, complains loss of fluid, denies vaginal bleeding.       Vital Signs:  Vitals:    24 0101 24 0131 24 0206 24 0211   BP: (!) 91/51 101/61     Pulse: 88 70     Resp:       Temp:       TempSrc:       SpO2:   96% 95%         FHT:  135 , moderate variability, accelerations present, decelerations absent  Contractions: irregular    Chaperone for Intimate Exam: Chaperone was present for entire exam, Chaperone Name: RN  Cervical Exam: 3 cm dilated, 80 effaced, 0 station  Pitocin: @ 0 mu/min    Membranes: Ruptured clear fluid  Scalp Electrode in place: absent  Intrauterine Pressure Catheter in Place: absent    Interventions: SVE    Assessment/Plan:  Max Verdin is a 23 y.o. female  at 35w3d admitted for IOL 2/2 PPROM   - GBS unknown, Pen G for GBS prophylaxis   - VSS, afebrile   - cEFM/TOCO   - SVE 3/80/0   - Discussed pain management and patient desires nubain and epidural in addition to nitrous and oral pain medications   - Will continue to monitor closely    Attending updated and in agreement with plan    Sade Dang DO  Ob/Gyn Resident  2024, 10:01 PM

## 2024-07-19 PROCEDURE — 6370000000 HC RX 637 (ALT 250 FOR IP)

## 2024-07-19 PROCEDURE — 1220000000 HC SEMI PRIVATE OB R&B

## 2024-07-19 RX ORDER — CYCLOBENZAPRINE HCL 10 MG
10 TABLET ORAL 3 TIMES DAILY PRN
Status: DISCONTINUED | OUTPATIENT
Start: 2024-07-19 | End: 2024-07-20 | Stop reason: HOSPADM

## 2024-07-19 RX ADMIN — SENNOSIDES AND DOCUSATE SODIUM 2 TABLET: 50; 8.6 TABLET ORAL at 19:55

## 2024-07-19 RX ADMIN — IBUPROFEN 600 MG: 600 TABLET, FILM COATED ORAL at 10:49

## 2024-07-19 RX ADMIN — FERROUS SULFATE TAB EC 325 MG (65 MG FE EQUIVALENT) 325 MG: 325 (65 FE) TABLET DELAYED RESPONSE at 10:49

## 2024-07-19 RX ADMIN — IBUPROFEN 600 MG: 600 TABLET, FILM COATED ORAL at 22:40

## 2024-07-19 RX ADMIN — ACETAMINOPHEN 1000 MG: 500 TABLET ORAL at 10:49

## 2024-07-19 RX ADMIN — IBUPROFEN 600 MG: 600 TABLET, FILM COATED ORAL at 04:46

## 2024-07-19 RX ADMIN — ACETAMINOPHEN 1000 MG: 500 TABLET ORAL at 22:40

## 2024-07-19 RX ADMIN — ACETAMINOPHEN 1000 MG: 500 TABLET ORAL at 04:46

## 2024-07-19 RX ADMIN — ACETAMINOPHEN 1000 MG: 500 TABLET ORAL at 16:41

## 2024-07-19 RX ADMIN — CYCLOBENZAPRINE 10 MG: 10 TABLET, FILM COATED ORAL at 04:56

## 2024-07-19 RX ADMIN — CYCLOBENZAPRINE 10 MG: 10 TABLET, FILM COATED ORAL at 19:55

## 2024-07-19 RX ADMIN — IBUPROFEN 600 MG: 600 TABLET, FILM COATED ORAL at 16:40

## 2024-07-19 ASSESSMENT — PAIN SCALES - GENERAL
PAINLEVEL_OUTOF10: 5
PAINLEVEL_OUTOF10: 7

## 2024-07-19 NOTE — PROGRESS NOTES
POST PARTUM DAY # 2    Max Verdin is a 23 y.o. female  This patient was seen & examined today.  on 24    Her pregnancy was complicated by:   Patient Active Problem List   Diagnosis    Tricyclic overdose, intentional self-harm, initial encounter ([facility])    Severe recurrent major depression without psychotic features (HCC)    Depression with suicidal ideation    SPROM (prolonged spontaneous rupture of membranes)     23 M APG 3/8 Wt 5#4       Today she is doing well without any chief complaint. Her lochia is light. She denies chest pain, shortness of breath, headache, lightheadedness and blurred vision. She is bottle feeding and she denies any breast tenderness. She is ambulating well. Her voiding pattern is normal. I reviewed signs and symptoms of post partum depression with the patient, she currently denies any of these symptoms. She is tolerating solids.     Vital Signs:  Vitals:    24 0800 24 1600 24 1958 24 0410   BP: 121/77 109/72 117/79 117/76   Pulse: 89 65 84 73   Resp: 18 16 16 16   Temp: 97.8 °F (36.6 °C) 98.2 °F (36.8 °C) 98 °F (36.7 °C) 97.7 °F (36.5 °C)   TempSrc: Oral Oral Oral Oral   SpO2: 97%  99%        Physical Exam:  General:  no apparent distress, alert and cooperative  Neurologic:  alert, oriented, normal speech, no focal findings or movement disorder noted  Lungs:  No increased work of breathing, good air exchange  Heart: regular rate and rhythm  Abdomen: abdomen soft, non-distended, non-tender  Fundus: non-tender, firm, below umbilicus  Extremities:  no calf tenderness, non edematous    Lab:  Lab Results   Component Value Date    HGB 10.6 (L) 2024     Lab Results   Component Value Date    HCT 29.5 (L) 2024       Assessment/Plan:  Max Verdin is a  PPD # 2 s/p    - Doing well, VSS   - male infant in NICU, circumcision desired   - Encourage ambulation   - Labs if sx   - Motrin/Tylenol/Flexeril    Rh positive/Rubella  immune    Bottle feeding   - Counseled on s/sx mastitis    Anemia   - Hgb 10.6 on admission   - Bleeding stable   - Clinically asymptomatic    Depression   - Mood stable on no medications   - Discussed PP depression    Elv BP x1  - Does not meet criteria for hypertensive disorder or pregnancy  - Denies s/s of PreE    Late Prenatal Care   - Social work consult post partum, ok to d/c with baby    BMI 48    Continue post partum care    Counseling Completed:  Secondary Smoke risks and Sudden Infant Death Syndrome were reviewed with recommendations. Infant sleeping, \"back to sleep\" and avoidance of co-sleeping recommendations were reviewed.  Signs and Symptoms of Post Partum Depression were reviewed. The patient is to call if any occur.  Signs and symptoms of Mastitis were reviewed. The patient is to call if any occur for follow up.  Discharge instructions including pelvic rest, no driving with pain medicine and office follow-up were reviewed with patient     Attending Physician: Dr. Angela Cobb MD  Ob/Gyn Resident   7/20/2024, 5:40 AM

## 2024-07-19 NOTE — PROGRESS NOTES
POST PARTUM DAY # 1    Max Verdin is a 23 y.o. female  This patient was seen & examined today.  on 24    Her pregnancy was complicated by:   Patient Active Problem List   Diagnosis    Tricyclic overdose, intentional self-harm, initial encounter ([facility])    Severe recurrent major depression without psychotic features (HCC)    Depression with suicidal ideation    SPROM (prolonged spontaneous rupture of membranes)     23 M APG 3/8 Wt 5#4       Today she is doing well without any chief complaint. Her lochia is light. She denies chest pain, shortness of breath, headache, lightheadedness and blurred vision. She is bottle feeding and she denies any breast tenderness. She is ambulating well. Her voiding pattern is normal. I reviewed signs and symptoms of post partum depression with the patient, she currently denies any of these symptoms. She is tolerating solids.     Vital Signs:  Vitals:    24 1400 24 2040 24 0445 24 0800   BP: 118/74 109/72 125/87 121/77   Pulse: 60 50 68 89   Resp: 18 16  18   Temp: 97.9 °F (36.6 °C) 98.3 °F (36.8 °C) 97.7 °F (36.5 °C) 97.8 °F (36.6 °C)   TempSrc: Oral Oral Oral Oral   SpO2:  99%  97%       Physical Exam:  General:  no apparent distress, alert and cooperative  Neurologic:  alert, oriented, normal speech, no focal findings or movement disorder noted  Lungs:  No increased work of breathing, good air exchange  Heart: regular rate and rhythm  Abdomen: abdomen soft, non-distended, non-tender  Fundus: non-tender, firm, below umbilicus  Extremities:  no calf tenderness, non edematous    Lab:  Lab Results   Component Value Date    HGB 10.6 (L) 2024     Lab Results   Component Value Date    HCT 29.5 (L) 2024       Assessment/Plan:  Max Verdin is a  PPD # 1 s/p    - Doing well, VSS   - male infant in NICU, circumcision desired   - Encourage ambulation   - Labs if sx   - Motrin/Tylenol/Flexeril    Rh positive/Rubella  immune    Bottle feeding   - Counseled on s/sx mastitis    Anemia   - Hgb 10.6 on admission   - Bleeding stable   - Clinically asymptomatic    Depression   - Mood stable on no medications   - Discussed PP depression    Late Prenatal Care   - Social work consult post partum    BMI 48    Continue post partum care    Counseling Completed:  Secondary Smoke risks and Sudden Infant Death Syndrome were reviewed with recommendations. Infant sleeping, \"back to sleep\" and avoidance of co-sleeping recommendations were reviewed.  Signs and Symptoms of Post Partum Depression were reviewed. The patient is to call if any occur.  Signs and symptoms of Mastitis were reviewed. The patient is to call if any occur for follow up.  Discharge instructions including pelvic rest, no driving with pain medicine and office follow-up were reviewed with patient     Attending Physician: Dr. Yuri Calles DO  Ob/Gyn Resident   2024, 5:59 AM     Date: 2024  Time: 10:21 AM      Patient Name: Max Verdin  Patient : 2000  Room/Bed: 0743/0743-01  Admission Date/Time: 2024  3:59 PM        Attending Physician Statement  I have personally seen, evaluated and discussed the care of Max Verdin, including pertinent history and exam findings with the resident. I have reviewed and edited their note in the electronic medical record. The key elements of all parts of the encounter have been performed/reviewed by me.  I agree with the assessment, plan and orders as documented by the resident.     The level of care submitted represents to the best of my ability the care documented in the medical record today.    GC Modifier.  This service has been performed in part by a resident under the direction of a teaching physician.        Attending's Name:  Alyson Welch MD      Patient doing well. She has no concerns or complaints. Continue routine post-partum care.

## 2024-07-19 NOTE — LACTATION NOTE
Pt inquired about pumping one breast at a time, encouraged to try pumping both together but may do one at a time if needed. Reviewed pump setting sand frequency and encouraged to reach out to lactation as needed.

## 2024-07-19 NOTE — LACTATION NOTE
Medical necessity form given to get pump through wic. Pt states she wanted to pump one breast at a time d/t leaking around flange, reviewed to maintain seal throughout pumping process. Encouraged to use 21 mm flanges.

## 2024-07-20 VITALS
SYSTOLIC BLOOD PRESSURE: 95 MMHG | HEART RATE: 82 BPM | TEMPERATURE: 98.3 F | DIASTOLIC BLOOD PRESSURE: 65 MMHG | RESPIRATION RATE: 16 BRPM | OXYGEN SATURATION: 98 %

## 2024-07-20 LAB
MICROORGANISM SPEC CULT: NORMAL
SERVICE CMNT-IMP: NORMAL
SPECIMEN DESCRIPTION: NORMAL

## 2024-07-20 PROCEDURE — 6370000000 HC RX 637 (ALT 250 FOR IP)

## 2024-07-20 RX ADMIN — ACETAMINOPHEN 1000 MG: 500 TABLET ORAL at 14:08

## 2024-07-20 RX ADMIN — ACETAMINOPHEN 1000 MG: 500 TABLET ORAL at 06:45

## 2024-07-20 RX ADMIN — IBUPROFEN 600 MG: 600 TABLET, FILM COATED ORAL at 14:09

## 2024-07-20 RX ADMIN — CYCLOBENZAPRINE 10 MG: 10 TABLET, FILM COATED ORAL at 06:45

## 2024-07-20 RX ADMIN — FERROUS SULFATE TAB EC 325 MG (65 MG FE EQUIVALENT) 325 MG: 325 (65 FE) TABLET DELAYED RESPONSE at 10:35

## 2024-07-20 RX ADMIN — IBUPROFEN 600 MG: 600 TABLET, FILM COATED ORAL at 06:45

## 2024-07-20 ASSESSMENT — PAIN DESCRIPTION - DESCRIPTORS: DESCRIPTORS: CRAMPING;DISCOMFORT

## 2024-07-20 ASSESSMENT — PAIN DESCRIPTION - LOCATION: LOCATION: ABDOMEN;PERINEUM

## 2024-07-20 ASSESSMENT — PAIN SCALES - GENERAL
PAINLEVEL_OUTOF10: 6
PAINLEVEL_OUTOF10: 5

## 2024-07-20 NOTE — PLAN OF CARE
Problem: Pain  Goal: Verbalizes/displays adequate comfort level or baseline comfort level  Outcome: Progressing     Problem: Postpartum  Goal: Experiences normal postpartum course  Description:  Postpartum OB-Pregnancy care plan goal which identifies if the mother is experiencing a normal postpartum course  Outcome: Progressing  Goal: Incisions, wounds, or drain sites healing without S/S of infection  Outcome: Progressing     Problem: Infection - Adult  Goal: Absence of infection at discharge  Outcome: Progressing  Goal: Absence of infection during hospitalization  Outcome: Progressing  Goal: Absence of fever/infection during anticipated neutropenic period  Outcome: Progressing     Problem: Safety - Adult  Goal: Free from fall injury  Outcome: Progressing     Problem: Discharge Planning  Goal: Discharge to home or other facility with appropriate resources  Outcome: Progressing     Problem: Chronic Conditions and Co-morbidities  Goal: Patient's chronic conditions and co-morbidity symptoms are monitored and maintained or improved  Outcome: Progressing

## 2024-07-20 NOTE — PROGRESS NOTES
CLINICAL PHARMACY NOTE: MEDS TO BEDS    Total # of Prescriptions Filled: 3   The following medications were delivered to the patient:  Senna  Acetaminophen es  ibuprofen    Additional Documentation:

## 2024-07-20 NOTE — PLAN OF CARE
Problem: Pain  Goal: Verbalizes/displays adequate comfort level or baseline comfort level  2024 1416 by Abby Rodriguez RN  Outcome: Adequate for Discharge  2024 040 by Candis Rajan RN  Outcome: Progressing     Problem: Postpartum  Goal: Experiences normal postpartum course  Description:  Postpartum OB-Pregnancy care plan goal which identifies if the mother is experiencing a normal postpartum course  2024 1416 by Abby Rodriguez RN  Outcome: Adequate for Discharge  2024 040 by Candis Rajan RN  Outcome: Progressing  Goal: Appropriate maternal -  bonding  Description:  Postpartum OB-Pregnancy care plan goal which identifies if the mother and  are bonding appropriately  Outcome: Adequate for Discharge  Goal: Establishment of infant feeding pattern  Description:  Postpartum OB-Pregnancy care plan goal which identifies if the mother is establishing a feeding pattern with their   Outcome: Adequate for Discharge  Goal: Incisions, wounds, or drain sites healing without S/S of infection  2024 1416 by Abby Rodriguez RN  Outcome: Adequate for Discharge  2024 040 by Candis Rajan RN  Outcome: Progressing     Problem: Infection - Adult  Goal: Absence of infection at discharge  2024 1416 by Abby Rodriguez RN  Outcome: Adequate for Discharge  2024 040 by Candis Rajan RN  Outcome: Progressing  Goal: Absence of infection during hospitalization  2024 1416 by Abby Rodriguez RN  Outcome: Adequate for Discharge  2024 040 by Candis Rajan RN  Outcome: Progressing  Goal: Absence of fever/infection during anticipated neutropenic period  2024 1416 by Abby Rodriguez RN  Outcome: Adequate for Discharge  2024 040 by Candis Rajan RN  Outcome: Progressing     Problem: Safety - Adult  Goal: Free from fall injury  2024 1416 by Abby Rodriguez RN  Outcome: Adequate for Discharge  2024 040 by Candis Rajan RN  Outcome: Progressing      Problem: Discharge Planning  Goal: Discharge to home or other facility with appropriate resources  7/20/2024 1416 by Abby Rodriguez, RN  Outcome: Adequate for Discharge  7/20/2024 0401 by Candis Rajan, RN  Outcome: Progressing     Problem: Chronic Conditions and Co-morbidities  Goal: Patient's chronic conditions and co-morbidity symptoms are monitored and maintained or improved  7/20/2024 1416 by Abby Rodriguez, RN  Outcome: Adequate for Discharge  7/20/2024 0401 by Candis Rajan, RN  Outcome: Progressing

## 2024-07-20 NOTE — LACTATION NOTE
Pt states pumping is going really well, notes an increase in volume. Has been in contact with [facility] but does not have a ump yet, will send home with hand pump.

## 2024-07-20 NOTE — FLOWSHEET NOTE
Writer sat with pt and went over discharge paperwork and education. Pt knows a follow up appointment with her OB has been scheduled for 7/31/24. Pt aware of the medications that she needs to be taking and how to take them. Post birth warning signs education complete. IV removed. Enough supplies given to patient for a couple days. Pt doesn't have any questions or concerns at this time. Pt left floor with all belongings at 1415, ambulating, accompanied by her partner. Pt declined WC and to be walked to car by a staff member.

## 2024-07-22 LAB — SURGICAL PATHOLOGY REPORT: NORMAL

## 2024-07-31 ENCOUNTER — TELEPHONE (OUTPATIENT)
Dept: OBGYN | Age: 24
End: 2024-07-31

## 2024-07-31 ENCOUNTER — POSTPARTUM VISIT (OUTPATIENT)
Dept: OBGYN | Age: 24
End: 2024-07-31
Payer: COMMERCIAL

## 2024-07-31 VITALS
SYSTOLIC BLOOD PRESSURE: 113 MMHG | DIASTOLIC BLOOD PRESSURE: 73 MMHG | BODY MASS INDEX: 22.13 KG/M2 | HEART RATE: 79 BPM | WEIGHT: 121 LBS

## 2024-07-31 RX ORDER — FERROUS SULFATE 325(65) MG
1 TABLET ORAL
COMMUNITY
Start: 2024-05-21

## 2024-07-31 NOTE — TELEPHONE ENCOUNTER
Patient called reporting she was just seen in the office but has a lump in her throat and she is concerned for a thyroid problem. Would like to be seen. Please advise.

## 2024-07-31 NOTE — TELEPHONE ENCOUNTER
Patient returned call and was provided phone numbers for [facility]. Patient verbalized understanding about how to establish care with [facility], denied questions.

## 2024-07-31 NOTE — PROGRESS NOTES
HPI:  DELIVERY DATE: 7/18/24  TYPE OF DELIVERY: normal spontaneous vaginal delivery  PROVIDER: RES  PERINEUM: ashley    Max was seen for her 2 week visit.  Her Male infant is healthy.  Pregnancy: Suggs   She is breast feeding.    She is breastfeeding without difficulty.  She is not experiencing pain.  She is rating her pain 0  She reports her lochia is staining only  She reports no perineal discomfort.  She denies urinary incontinence.  Her bowels have returned to her normal pattern.  She is not back to her normal activity pattern.  Max has not engaged in intercourse.    She does report a mood disorder.    She feels she is not having difficulty coping.  Max feels her family adjustment is effective.  She reports an overall positive birth experience.      OBJECTIVE:   Wt Readings from Last 3 Encounters:   07/31/24 54.9 kg (121 lb)   11/12/23 48.5 kg (107 lb)   05/06/23 45.4 kg (100 lb)       Blood pressure 113/73, pulse 79, weight 54.9 kg (121 lb), currently breastfeeding.        ASSESSMENT/PLAN:    2  week postpartum visit  Labs were ordered.  Return to the office in 4 weeks.  She will return for 6 week PP visit  breast feeding  Signs & Symptoms of mastitis reviewed; notify if occurs  Family planning/birth spacing needs  Family planning counseling was initiated/completed.   Smoker/non-smoker  Secondary smoke risks were reviewed, including increased risks of respiratory     problems, Sudden Infant Death Syndrome, and potential malignancies.  Wants Paragard IUD. Does not want hormonal.     The patient, Max Verdin,  was seen with a total time spent of 20 minutes for the visit on this date of service by the [facility]  The time component, involved both face-to-face (counseling and education)  and non face-to-face time (care coordination), spent in determining the total time component.

## 2024-07-31 NOTE — TELEPHONE ENCOUNTER
Called patient to connect her with [facility], per Dr. Schafer. No answer, left message with request for patient to call the office.

## 2024-08-28 ENCOUNTER — POSTPARTUM VISIT (OUTPATIENT)
Dept: OBGYN | Age: 24
End: 2024-08-28
Payer: MEDICARE

## 2024-08-28 VITALS
DIASTOLIC BLOOD PRESSURE: 73 MMHG | HEIGHT: 62 IN | BODY MASS INDEX: 22.82 KG/M2 | WEIGHT: 124 LBS | HEART RATE: 66 BPM | SYSTOLIC BLOOD PRESSURE: 109 MMHG

## 2024-08-28 NOTE — PROGRESS NOTES
Chief Complaint   Patient presents with    Postpartum Care     Patient presents today with postpartum visit   Patient says she is doing okay but is feeling stressed       HPI:  DELIVERY DATE: 7/18/24  TYPE OF DELIVERY: normal spontaneous vaginal delivery  PROVIDER: Res  PERINEUM: ashley    Max was seen for her 6 week visit.  Her Male infant is healthy.  She is bottle feeding.    She is experiencing pain.  She is rating her pain in back 3  She reports her lochia is staining only  She reports none perineal discomfort.  She denies urinary incontinence.  Her bowels have returned to her normal pattern.  She is back to her normal activity pattern.  She has not her first menses.   Max has not engaged in intercourse.    She does not report a mood disorder.    She feels she is not having difficulty coping.  Max feels her family adjustment is effective.  She denies an overall positive birth experience.  Her [place] Score is 15.  This score does match my assessment.  We discussed depression and anxiety for which she has a history. Has not taken SSRI but is willing to try it. Zoloft was prescribed with instruction to take 1/2 tab for first week then whole tablet following.   Will follow up in 4 weeks for evaluation of effectiveness.     OBJECTIVE:   Wt Readings from Last 3 Encounters:   08/28/24 56.2 kg (124 lb)   07/31/24 54.9 kg (121 lb)   11/12/23 48.5 kg (107 lb)       Blood pressure 109/73, pulse 66, height 1.575 m (5' 2.01\"), weight 56.2 kg (124 lb), currently breastfeeding.    Breast exam: Normal to inspection    Abdomen: Soft non-tender without guarding.  There is not diastasis present.  The diastasis is 0 finger breaths of separation.    Perineum: not inspected,     Extremities: No calf tenderness bilaterally. No edema.      ASSESSMENT/PLAN:    6 week postpartum visit  She will return for follow up  Labs were ordered.   Date of last pap: 2024.  bottle feeding  Signs & Symptoms of mastitis reviewed; notify if

## 2024-08-29 ENCOUNTER — TELEPHONE (OUTPATIENT)
Dept: OBGYN | Age: 24
End: 2024-08-29

## 2024-08-29 NOTE — TELEPHONE ENCOUNTER
Vernon Schafer,    Pt was seen yesterday, need a new letter stating she need to return to work on  9/25/2024 to take to her Job, today please.    Pt was given a letter that had another person name and personal info on it.       Message was sent to Manager concerning this matter.    Thank You.

## 2024-09-03 ENCOUNTER — TELEPHONE (OUTPATIENT)
Dept: OBGYN | Age: 24
End: 2024-09-03

## 2024-09-03 NOTE — TELEPHONE ENCOUNTER
Pt is calling to see if her disability forms are completed. Pt states Brandon faxed them to us last week.     Thank You.

## 2024-09-04 NOTE — TELEPHONE ENCOUNTER
Forms were received Thursday 8/29 and can take 10-14 business days to be completed. Unfortunately, we were not in the office Monday and they have not been completed yet.  I am going to complete paperwork this afternoon for the  To sign tomorrow when she is in.    Left message for pt to call back

## 2024-09-04 NOTE — TELEPHONE ENCOUNTER
Pt call back concerning her forms. Pt was advised it takes 10-14 business days to complete.    Thank You.

## 2024-09-05 NOTE — TELEPHONE ENCOUNTER
Left message for pt to call back.  Paperwork is signed and completed.  Would she like them faxed or would she like to pick them up?

## 2024-09-25 ENCOUNTER — OFFICE VISIT (OUTPATIENT)
Dept: OBGYN | Age: 24
End: 2024-09-25
Payer: MEDICAID

## 2024-09-25 VITALS
HEART RATE: 68 BPM | WEIGHT: 131 LBS | DIASTOLIC BLOOD PRESSURE: 67 MMHG | BODY MASS INDEX: 23.95 KG/M2 | SYSTOLIC BLOOD PRESSURE: 112 MMHG

## 2024-09-25 PROCEDURE — 99213 OFFICE O/P EST LOW 20 MIN: CPT | Performed by: ADVANCED PRACTICE MIDWIFE

## 2024-09-25 RX ORDER — PNV NO.95/FERROUS FUM/FOLIC AC 28MG-0.8MG
1 TABLET ORAL DAILY
Qty: 30 TABLET | Refills: 12 | Status: SHIPPED | OUTPATIENT
Start: 2024-09-25

## 2024-09-25 RX ORDER — FERROUS SULFATE 325(65) MG
1 TABLET ORAL
Qty: 30 TABLET | Status: CANCELLED | OUTPATIENT
Start: 2024-09-25

## 2024-09-25 ASSESSMENT — ENCOUNTER SYMPTOMS
RESPIRATORY NEGATIVE: 1
EYES NEGATIVE: 1
ALLERGIC/IMMUNOLOGIC NEGATIVE: 1
GASTROINTESTINAL NEGATIVE: 1

## 2024-10-10 ENCOUNTER — TELEPHONE (OUTPATIENT)
Dept: OBGYN | Age: 24
End: 2024-10-10

## 2024-10-10 NOTE — TELEPHONE ENCOUNTER
Received disability forms from [employer] on 10/7    Called and spoke with patient inquiring what these forms are for.  It looks like we filled out disability paperwork in September.  She said she is unsure why they are faxing them again and will contact them    Blank forms scanned into media

## 2024-10-22 ENCOUNTER — TELEPHONE (OUTPATIENT)
Dept: OBGYN | Age: 24
End: 2024-10-22

## 2024-10-22 NOTE — TELEPHONE ENCOUNTER
I called pt and LVM to have her call me back regarding her FMLA paperwork for depression. Glenna saw this pt and we did not get this paperwork till after her evaluation. She needs re-evaluated per Dr. Coronel so we can fill out her paperwork properly. Dr Coronel will do a phone visit with her.         Pt called back minutes later and pt is scheduled.

## 2024-10-30 ENCOUNTER — SCHEDULED TELEPHONE ENCOUNTER (OUTPATIENT)
Dept: OBGYN | Age: 24
End: 2024-10-30

## 2024-10-30 NOTE — PROGRESS NOTES
Obstetric/Gynecology Resident Telephone Visit Note    Max Verdin is a 24 y.o. female evaluated via telephone on 10/30/2024.      Over the phone follow-up and assessment completed today regarding severe postpartum depression.    Patient is feeling much better today, however was struggling with severe postpartum depression.  Patient had an infant with a NICU stay for 2 weeks.  She was unable to go to work at that time.  Her memory, concentration, focus were impaired.  Patient was able to safely care for her child, however the significant stress and depression symptoms prevented her from being able to go to work.  Patient was struggling with simple day-to-day tasks like grocery shopping or remaining attentive in conversation.  During that time.  Patient had significant weight loss.  She also had sleep disturbances and issues with socialization.  At that time patient was not competent enough to go to work.  Plan for patient to make a full recovery.  Appropriate paperwork filled out and to be faxed.  She currently denies any suicidal or homicidal ideation    Consent:  She and/or health care decision maker is aware that that she may receive a bill for this telephone service, depending on her insurance coverage, and has provided verbal consent to proceed: Yes    Documentation:  I communicated with the patient and/or health care decision maker about postpartum depression.   Details of this discussion including any medical advice provided: Patient was unable to work due to severe postpartum depression, expect full recovery    I affirm this is a Patient Initiated Episode with an Established Patient who has not had a related appointment within my department in the past 7 days or scheduled within the next 24 hours.    Patient identification was verified at the start of the visit: Yes    Total Time: minutes: 11-20 minutes    Note: not billable if this call serves to triage the patient into an appointment for the relevant

## 2024-11-11 NOTE — PROGRESS NOTES
05/08/23 1531   Encounter Summary   Encounter Overview/Reason  Spiritual/Emotional Needs   Service Provided For: Patient   Referral/Consult From: Feng   Last Encounter  05/08/23   Complexity of Encounter Moderate   Begin Time 0230   End Time  0300   Total Time Calculated 30 min   Spiritual/Emotional needs   Type Spiritual Support   Behavioral Health    Type  Spirituality Group   Assessment/Intervention/Outcome   Assessment Calm   Intervention Active listening;Nurtured Hope;Sustaining Presence/Ministry of presence   Outcome Receptive; Expressed Gratitude;Engaged in conversation
CLINICAL PHARMACY NOTE: MEDS TO BEDS    Total # of Prescriptions Filled: 2   The following medications were delivered to the patient:  Mirtazapine 7.5mg  Trazodone HCL 100mg    Additional Documentation:  Delivered Medication to [address] [place] [address]
to results   calculated using previous equations. The CKD-EPI equation is less accurate in patients with extremes of muscle mass, extra-renal   metabolism of creatine, excessive creatine ingestion, or following therapy that affects   renal tubular secretion. Calcium 05/08/2023 8.8  8.6 - 10.4 mg/dL Final    Sodium 05/08/2023 140  135 - 144 mmol/L Final    Potassium 05/08/2023 3.4 (L)  3.7 - 5.3 mmol/L Final    Chloride 05/08/2023 106  98 - 107 mmol/L Final    CO2 05/08/2023 25  20 - 31 mmol/L Final    Anion Gap 05/08/2023 9  9 - 17 mmol/L Final         Reviewed patient's current plan of care and vital signs with nursing staff.     Labs reviewed: [x] Yes discussed hypokalemia and patient is agreeable K-Chlor that  has been ordered    Medications  Current Facility-Administered Medications: mirtazapine (REMERON) tablet 7.5 mg, 7.5 mg, Oral, Nightly  benzocaine (ORAJEL) 10 % mucosal gel, , Mouth/Throat, BID  potassium chloride (KLOR-CON M) extended release tablet 40 mEq, 40 mEq, Oral, Once  haloperidol (HALDOL) tablet 5 mg, 5 mg, Oral, Q6H PRN **AND** LORazepam (ATIVAN) tablet 2 mg, 2 mg, Oral, Q6H PRN  haloperidol lactate (HALDOL) injection 5 mg, 5 mg, IntraMUSCular, Q6H PRN **AND** LORazepam (ATIVAN) injection 2 mg, 2 mg, IntraMUSCular, Q6H PRN **AND** diphenhydrAMINE (BENADRYL) injection 50 mg, 50 mg, IntraMUSCular, Q6H PRN  acetaminophen (TYLENOL) tablet 650 mg, 650 mg, Oral, Q6H PRN  ibuprofen (ADVIL;MOTRIN) tablet 400 mg, 400 mg, Oral, Q6H PRN  hydrOXYzine HCl (ATARAX) tablet 50 mg, 50 mg, Oral, TID PRN  traZODone (DESYREL) tablet 50 mg, 50 mg, Oral, Nightly PRN  polyethylene glycol (GLYCOLAX) packet 17 g, 17 g, Oral, Daily PRN  aluminum & magnesium hydroxide-simethicone (MAALOX) 200-200-20 MG/5ML suspension 30 mL, 30 mL, Oral, Q6H PRN  nicotine polacrilex (NICORETTE) gum 2 mg, 2 mg, Oral, Q2H PRN    ASSESSMENT  Depression with suicidal ideation         PLAN  Patient symptoms are: Showing
EUS with FNA

## 2025-01-15 ENCOUNTER — HOSPITAL ENCOUNTER (EMERGENCY)
Age: 25
Discharge: HOME | End: 2025-01-15
Attending: STUDENT IN AN ORGANIZED HEALTH CARE EDUCATION/TRAINING PROGRAM
Payer: MEDICAID

## 2025-01-15 VITALS
WEIGHT: 115 LBS | BODY MASS INDEX: 21.03 KG/M2 | SYSTOLIC BLOOD PRESSURE: 115 MMHG | RESPIRATION RATE: 16 BRPM | OXYGEN SATURATION: 96 % | DIASTOLIC BLOOD PRESSURE: 68 MMHG | TEMPERATURE: 98.4 F | HEART RATE: 100 BPM

## 2025-01-15 DIAGNOSIS — T78.40XA ALLERGIC REACTION, INITIAL ENCOUNTER: Primary | ICD-10-CM

## 2025-01-15 DIAGNOSIS — L50.9 URTICARIA: ICD-10-CM

## 2025-01-15 LAB
ALBUMIN SERPL-MCNC: 4.4 G/DL (ref 3.5–5.2)
ALBUMIN/GLOB SERPL: 1.8 {RATIO} (ref 1–2.5)
ALP SERPL-CCNC: 66 U/L (ref 35–104)
ALT SERPL-CCNC: 17 U/L (ref 10–35)
ANION GAP SERPL CALCULATED.3IONS-SCNC: 11 MMOL/L (ref 9–16)
AST SERPL-CCNC: 18 U/L (ref 10–35)
BASOPHILS # BLD: 0 K/UL (ref 0–0.2)
BASOPHILS NFR BLD: 0 %
BILIRUB DIRECT SERPL-MCNC: 0.4 MG/DL (ref 0–0.2)
BILIRUB INDIRECT SERPL-MCNC: 0.6 MG/DL
BILIRUB SERPL-MCNC: 1 MG/DL (ref 0–1.2)
BUN SERPL-MCNC: 13 MG/DL (ref 6–20)
CALCIUM SERPL-MCNC: 9.3 MG/DL (ref 8.6–10.4)
CHLORIDE SERPL-SCNC: 106 MMOL/L (ref 98–107)
CO2 SERPL-SCNC: 24 MMOL/L (ref 20–31)
CREAT SERPL-MCNC: 0.7 MG/DL (ref 0.5–0.9)
EOSINOPHIL # BLD: 0 K/UL (ref 0–0.4)
EOSINOPHILS RELATIVE PERCENT: 0 % (ref 1–4)
ERYTHROCYTE [DISTWIDTH] IN BLOOD BY AUTOMATED COUNT: 12.7 % (ref 11.8–14.4)
GFR, ESTIMATED: >90 ML/MIN/1.73M2
GLUCOSE SERPL-MCNC: 94 MG/DL (ref 74–99)
HCG SERPL QL: NEGATIVE
HCT VFR BLD AUTO: 37.8 % (ref 36.3–47.1)
HGB BLD-MCNC: 13.3 G/DL (ref 11.9–15.1)
IMM GRANULOCYTES # BLD AUTO: 0.09 K/UL (ref 0–0.3)
IMM GRANULOCYTES NFR BLD: 1 %
LYMPHOCYTES NFR BLD: 0.53 K/UL (ref 1–4.8)
LYMPHOCYTES RELATIVE PERCENT: 6 % (ref 24–44)
MCH RBC QN AUTO: 32.7 PG (ref 25.2–33.5)
MCHC RBC AUTO-ENTMCNC: 35.2 G/DL (ref 28.4–34.8)
MCV RBC AUTO: 92.9 FL (ref 82.6–102.9)
MONOCYTES NFR BLD: 0.36 K/UL (ref 0.2–0.8)
MONOCYTES NFR BLD: 4 % (ref 1–7)
NEUTROPHILS NFR BLD: 89 % (ref 36–66)
NEUTS SEG NFR BLD: 7.92 K/UL (ref 1.8–7.7)
NRBC BLD-RTO: 0 PER 100 WBC
PLATELET # BLD AUTO: 240 K/UL (ref 138–453)
PMV BLD AUTO: 9.8 FL (ref 8.1–13.5)
POTASSIUM SERPL-SCNC: 3.7 MMOL/L (ref 3.7–5.3)
PROT SERPL-MCNC: 6.8 G/DL (ref 6.6–8.7)
RBC # BLD AUTO: 4.07 M/UL (ref 3.95–5.11)
SODIUM SERPL-SCNC: 141 MMOL/L (ref 136–145)
WBC OTHER # BLD: 8.9 K/UL (ref 3.5–11.3)

## 2025-01-15 PROCEDURE — 2580000003 HC RX 258: Performed by: PHYSICIAN ASSISTANT

## 2025-01-15 PROCEDURE — 96374 THER/PROPH/DIAG INJ IV PUSH: CPT

## 2025-01-15 PROCEDURE — 96375 TX/PRO/DX INJ NEW DRUG ADDON: CPT

## 2025-01-15 PROCEDURE — 82248 BILIRUBIN DIRECT: CPT

## 2025-01-15 PROCEDURE — 99284 EMERGENCY DEPT VISIT MOD MDM: CPT

## 2025-01-15 PROCEDURE — 84703 CHORIONIC GONADOTROPIN ASSAY: CPT

## 2025-01-15 PROCEDURE — 2500000003 HC RX 250 WO HCPCS: Performed by: PHYSICIAN ASSISTANT

## 2025-01-15 PROCEDURE — 85025 COMPLETE CBC W/AUTO DIFF WBC: CPT

## 2025-01-15 PROCEDURE — 6360000002 HC RX W HCPCS: Performed by: PHYSICIAN ASSISTANT

## 2025-01-15 PROCEDURE — 6370000000 HC RX 637 (ALT 250 FOR IP): Performed by: PHYSICIAN ASSISTANT

## 2025-01-15 PROCEDURE — 80053 COMPREHEN METABOLIC PANEL: CPT

## 2025-01-15 RX ORDER — PREDNISONE 50 MG/1
50 TABLET ORAL DAILY
Qty: 4 TABLET | Refills: 0 | Status: SHIPPED | OUTPATIENT
Start: 2025-01-15 | End: 2025-01-19

## 2025-01-15 RX ORDER — DIPHENHYDRAMINE HYDROCHLORIDE 50 MG/ML
25 INJECTION INTRAMUSCULAR; INTRAVENOUS ONCE
Status: COMPLETED | OUTPATIENT
Start: 2025-01-15 | End: 2025-01-15

## 2025-01-15 RX ORDER — 0.9 % SODIUM CHLORIDE 0.9 %
1000 INTRAVENOUS SOLUTION INTRAVENOUS ONCE
Status: COMPLETED | OUTPATIENT
Start: 2025-01-15 | End: 2025-01-15

## 2025-01-15 RX ORDER — ONDANSETRON 4 MG/1
4 TABLET, ORALLY DISINTEGRATING ORAL 3 TIMES DAILY PRN
Qty: 12 TABLET | Refills: 0 | Status: SHIPPED | OUTPATIENT
Start: 2025-01-15

## 2025-01-15 RX ORDER — FAMOTIDINE 20 MG/1
20 TABLET, FILM COATED ORAL 2 TIMES DAILY
Qty: 8 TABLET | Refills: 0 | Status: SHIPPED | OUTPATIENT
Start: 2025-01-15 | End: 2025-01-19

## 2025-01-15 RX ORDER — ONDANSETRON 2 MG/ML
4 INJECTION INTRAMUSCULAR; INTRAVENOUS ONCE
Status: COMPLETED | OUTPATIENT
Start: 2025-01-15 | End: 2025-01-15

## 2025-01-15 RX ORDER — PREDNISONE 20 MG/1
60 TABLET ORAL ONCE
Status: COMPLETED | OUTPATIENT
Start: 2025-01-15 | End: 2025-01-15

## 2025-01-15 RX ADMIN — FAMOTIDINE 20 MG: 10 INJECTION INTRAVENOUS at 21:38

## 2025-01-15 RX ADMIN — SODIUM CHLORIDE 1000 ML: 9 INJECTION, SOLUTION INTRAVENOUS at 21:32

## 2025-01-15 RX ADMIN — ONDANSETRON 4 MG: 2 INJECTION, SOLUTION INTRAMUSCULAR; INTRAVENOUS at 21:38

## 2025-01-15 RX ADMIN — DIPHENHYDRAMINE HYDROCHLORIDE 25 MG: 50 INJECTION INTRAMUSCULAR; INTRAVENOUS at 21:38

## 2025-01-15 RX ADMIN — PREDNISONE 60 MG: 20 TABLET ORAL at 21:38

## 2025-01-15 ASSESSMENT — PAIN SCALES - GENERAL: PAINLEVEL_OUTOF10: 0

## 2025-01-15 ASSESSMENT — PAIN - FUNCTIONAL ASSESSMENT: PAIN_FUNCTIONAL_ASSESSMENT: 0-10

## 2025-01-16 NOTE — ED PROVIDER NOTES
EMERGENCY DEPARTMENT ENCOUNTER    Pt Name: Max Verdin  MRN: [id number]  Birthdate 2000  Date of evaluation: 1/15/25  CHIEF COMPLAINT       Chief Complaint   Patient presents with    Urticaria     Took benadryl. Had [place] last night    Vomiting     Once an hour since this AM     HISTORY OF PRESENT ILLNESS   Is a 24-year-old female who presents with hives, she reports that she noticed these this morning when she woke up.  They started as small macular papular rash and slowly throughout the day they have turned into hives.  She states that they are very pruritic.  In addition she has had nausea and vomiting throughout the day.  The patient denies any shortness of breath.  She has not noticed any itching or swelling in the mouth.  She did eat at [email or web address] last night but states that she has had the beef and onions sandwich in the past.  No new soaps lotions shampoos or medications.  She is not allergic to anything that she knows of.  She did try to take a Benadryl earlier today but threw up right afterwards so she was not sure if it stayed down.  Her last menstrual cycle was 2 weeks ago, regular and on time.  She has not been short of breath.             REVIEW OF SYSTEMS     Review of Systems   Skin:  Positive for rash.     PASTMEDICAL HISTORY     Past Medical History:   Diagnosis Date    Depressed      Past Problem List  Patient Active Problem List   Diagnosis Code    Tricyclic overdose, intentional self-harm, initial encounter ([facility]) T43.012A    Severe recurrent major depression without psychotic features ([facility]) F33.2    Depression with suicidal ideation F32.A, R45.851    SPROM (prolonged spontaneous rupture of membranes) O42.90     23 M APG 3/8 Wt 5#4 O80     SURGICAL HISTORY     No past surgical history on file.  CURRENT MEDICATIONS       Discharge Medication List as of 1/15/2025 11:27 PM        CONTINUE these medications which have NOT CHANGED    Details   Prenatal Vit-Fe Fumarate-FA

## 2025-01-16 NOTE — DISCHARGE INSTRUCTIONS
Please call your primary care physician's office tomorrow to schedule follow-up for 1 to 3 days    Please take the medications as prescribed.  The Zofran that you only have to take as needed if your nausea returns.  You can continue to take 25 mg of Benadryl up to 3 times daily however this medication will make you sleepy.

## 2025-08-03 ENCOUNTER — HOSPITAL ENCOUNTER (EMERGENCY)
Age: 25
Discharge: HOME | End: 2025-08-03
Attending: EMERGENCY MEDICINE
Payer: MEDICAID

## 2025-08-03 VITALS
HEIGHT: 62 IN | DIASTOLIC BLOOD PRESSURE: 81 MMHG | HEART RATE: 81 BPM | WEIGHT: 120 LBS | RESPIRATION RATE: 16 BRPM | TEMPERATURE: 98.4 F | SYSTOLIC BLOOD PRESSURE: 117 MMHG | OXYGEN SATURATION: 100 % | BODY MASS INDEX: 22.08 KG/M2

## 2025-08-03 DIAGNOSIS — K08.89 PAIN, DENTAL: Primary | ICD-10-CM

## 2025-08-03 PROCEDURE — 6370000000 HC RX 637 (ALT 250 FOR IP): Performed by: EMERGENCY MEDICINE

## 2025-08-03 PROCEDURE — 99283 EMERGENCY DEPT VISIT LOW MDM: CPT

## 2025-08-03 RX ORDER — OXYCODONE AND ACETAMINOPHEN 5; 325 MG/1; MG/1
1 TABLET ORAL ONCE
Refills: 0 | Status: COMPLETED | OUTPATIENT
Start: 2025-08-03 | End: 2025-08-03

## 2025-08-03 RX ORDER — HYDROCODONE BITARTRATE AND ACETAMINOPHEN 5; 325 MG/1; MG/1
1 TABLET ORAL EVERY 4 HOURS PRN
Qty: 12 TABLET | Refills: 0 | Status: SHIPPED | OUTPATIENT
Start: 2025-08-03 | End: 2025-08-06

## 2025-08-03 RX ORDER — ONDANSETRON 4 MG/1
4 TABLET, ORALLY DISINTEGRATING ORAL ONCE
Status: COMPLETED | OUTPATIENT
Start: 2025-08-03 | End: 2025-08-03

## 2025-08-03 RX ORDER — ONDANSETRON 4 MG/1
4 TABLET, ORALLY DISINTEGRATING ORAL 3 TIMES DAILY PRN
Qty: 12 TABLET | Refills: 0 | Status: SHIPPED | OUTPATIENT
Start: 2025-08-03

## 2025-08-03 RX ADMIN — ONDANSETRON 4 MG: 4 TABLET, ORALLY DISINTEGRATING ORAL at 07:48

## 2025-08-03 RX ADMIN — OXYCODONE AND ACETAMINOPHEN 1 TABLET: 5; 325 TABLET ORAL at 07:47

## 2025-08-03 ASSESSMENT — PAIN DESCRIPTION - FREQUENCY: FREQUENCY: CONTINUOUS

## 2025-08-03 ASSESSMENT — PAIN DESCRIPTION - PAIN TYPE: TYPE: ACUTE PAIN

## 2025-08-03 ASSESSMENT — PAIN DESCRIPTION - ORIENTATION
ORIENTATION: RIGHT;LOWER
ORIENTATION: RIGHT

## 2025-08-03 ASSESSMENT — PAIN DESCRIPTION - DESCRIPTORS: DESCRIPTORS: SHARP

## 2025-08-03 ASSESSMENT — PAIN - FUNCTIONAL ASSESSMENT: PAIN_FUNCTIONAL_ASSESSMENT: 0-10

## 2025-08-03 ASSESSMENT — PAIN DESCRIPTION - LOCATION
LOCATION: TEETH
LOCATION: TEETH

## 2025-08-03 ASSESSMENT — PAIN SCALES - GENERAL: PAINLEVEL_OUTOF10: 10
